# Patient Record
Sex: MALE | Race: WHITE | Employment: OTHER | URBAN - METROPOLITAN AREA
[De-identification: names, ages, dates, MRNs, and addresses within clinical notes are randomized per-mention and may not be internally consistent; named-entity substitution may affect disease eponyms.]

---

## 2017-01-25 ENCOUNTER — OFFICE VISIT (OUTPATIENT)
Dept: CARDIOLOGY CLINIC | Age: 62
End: 2017-01-25

## 2017-01-25 VITALS
DIASTOLIC BLOOD PRESSURE: 88 MMHG | BODY MASS INDEX: 24.13 KG/M2 | WEIGHT: 188 LBS | HEART RATE: 69 BPM | SYSTOLIC BLOOD PRESSURE: 122 MMHG | HEIGHT: 74 IN | OXYGEN SATURATION: 98 %

## 2017-01-25 DIAGNOSIS — I49.3 PVC (PREMATURE VENTRICULAR CONTRACTION): ICD-10-CM

## 2017-01-25 DIAGNOSIS — E78.2 MIXED HYPERLIPIDEMIA: ICD-10-CM

## 2017-01-25 DIAGNOSIS — I48.0 PAROXYSMAL ATRIAL FIBRILLATION (HCC): Primary | ICD-10-CM

## 2017-01-25 NOTE — PROGRESS NOTES
Visit Vitals    /88 (BP 1 Location: Left arm, BP Patient Position: Sitting)    Pulse 69    Ht 6' 1.8\" (1.875 m)    Wt 188 lb (85.3 kg)    SpO2 98%    BMI 24.27 kg/m2   ;

## 2017-01-25 NOTE — PATIENT INSTRUCTIONS

## 2017-01-25 NOTE — PROGRESS NOTES
Ranjit Dinero MD. [facility]              Patient: Tyrone Rain  : 1955      Today's Date: 2017            HISTORY OF PRESENT ILLNESS:     History of Present Illness:  Mr. Urmila Cherry is doing well overall. Some palpitations sometimes - lasts a few seconds - random - happens rarely - when he is trying to sleep. He has some tingling in legs sometimes. No leg pain walking. No CP or SOB. He is active at work. PAST MEDICAL HISTORY:     Past Medical History   Diagnosis Date    Atrial fibrillation [facility])      ER visit on 12 - converted to NSR with Corvert    Bradycardia      Started seeing Dr. Masoud Kingsley and Dr. Juany Appiahelor for this years ago    GERD (gastroesophageal reflux disease)      [place]    Hypoglycemia     Mixed hyperlipidemia     Nausea & vomiting     PVC (premature ventricular contraction)        Past Surgical History   Procedure Laterality Date    Pr total knee arthroplasty  2008     right    Hx knee arthroscopy  1972     right    Hx urological       epididydectomy left    Hx other surgical  ,      excision 7 lipomas, excision 3 lipomas,mole excision back    Echo stress       normal resting LV wall motion and no evidence of ischemia. Gated EF (%): 60-65%    Loop monitor       no significant arrhythmias infrequent atrial extrasystoles and ventricular extrasystoles.  Hx other surgical       Echo 12 - Normal    Hx gi  1998     Nissen fundiplication    Hx gi       polypectomy from colon    Hx hernia repair       Left IHR, right IHR, hiatal hernia    Hx hernia repair           MEDICATIONS:     Current Outpatient Prescriptions   Medication Sig Dispense Refill    simvastatin (ZOCOR) 40 mg tablet Take 1 Tab by mouth nightly. 30 Tab 6    allopurinol (ZYLOPRIM) 100 mg tablet Take  by mouth daily.  FERROUS FUMARATE (IRON PO) Take 30 mg by mouth daily.  INDOMETHACIN (INDOCIN PO) Take 50 mg by mouth as needed.       multivitamin, stress formula (STRESS TAB) tablet Take 1 Tab by mouth daily.  aspirin delayed-release (ASPIR-81) 81 mg tablet Take 81 mg by mouth daily.  omega-3 fatty acids-vitamin e (FISH OIL) 1,000 mg Cap Take 2 Caps by mouth daily. Allergies   Allergen Reactions    Vioxx [Rofecoxib] Other (comments)     Mouth ulcers    Zofran Odt [Ondansetron] Nausea Only             SOCIAL HISTORY:     Social History   Substance Use Topics    Smoking status: Former Smoker     Packs/day: 1.00     Years: 12.00     Quit date: 3/31/1986    Smokeless tobacco: None    Alcohol use 1.0 oz/week     2 Cans of beer per week           FAMILY HISTORY:     Family History   Problem Relation Age of Onset    Arrhythmia Father             REVIEW OF SYSTEMS:      Review of Systems:   Constitutional: Negative for fever, chills   HEENT: Negative for vision changes. Respiratory: Negative for cough   Cardiovascular: Negative for orthopnea, syncope, and PND. Gastrointestinal: Negative for abdominal pain, diarrhea, or melena   Genitourinary: Negative for dysuria   Musculoskeletal: Negative for myalgias. Skin: Negative for rash   Heme: No problems bleeding. Neurological: Negative for speech change and focal weakness.                PHYSICAL EXAM:     Physical Exam:  Visit Vitals    /88 (BP 1 Location: Left arm, BP Patient Position: Sitting)    Pulse 69    Ht 6' 1.8\" (1.875 m)    Wt 188 lb (85.3 kg)    SpO2 98%    BMI 24.27 kg/m2     Patient appears generally well, mood and affect are appropriate and pleasant. HEENT:  Hearing intact, non-icteric, normocephalic, atraumatic. Neck Exam: Supple, No JVD or carotid bruits. Lung Exam: Clear to auscultation, even breath sounds. Cardiac Exam: Regular rate and rhythm with no murmur  Abdomen: Soft, non-tender, normal bowel sounds. No bruits or masses. Extremities: Moves all ext well. No lower extremity edema. Vascular: 2+ dorsalis pedis pulses bilaterally.   Psych: Appropriate affect  Neuro - Grossly intact             LABS / OTHER STUDIES:         Labs - 7/16/12 - CBC and CMP normal; chol 142, TG 95, LDL 68, HDL 55   Labs 12/11/14 - chol 154, TG 68, HDL 72, LDL 68, Ca 11, K 4.1, Cr 1.07, LFT's OK           CARDIAC DIAGNOSTICS:      Cardiac Evaluation Includes:  Treadmill stress test 12/2/13 - walked 10:04 (11.8 METS). Sinus bradycardia at rest. Stress  bpm (94% MPHR). No ischemic EKG changes. Frequent PVC's in exercise and recovery (asymptomatic). Echo 12/2/13 - LVEF 60-65%. Mild LAE.         EKG - 7/18/12 - NSR, HR 61, normal   EKG - 9/30/13 - NSR, normal  EKG 11/17/14 - sinus bradycardia, HR 50  EKG 11/30/15 - sinus bradycardia, HR 49  EKG 1/25/17 - sinus bradycardia, HR 55           ASSESSMENT AND PLAN:      Assessment and Plan:   1) Singe documented episode of Afib 2/12   - he was in the ER on 11/10/13 with some palpitations -he may have had some PAF according to PA note, however only documentation of NSR on EKG   - His [id number] score is 0   - Echo 12/2/13 - LVEF 60-65%. Mild LAE. - no standing beta-blocker due to resting bradycardia      2) Chronic resting bradycardia   - asymptomatic   - Event monitor in 2007 unremarkable   - Treadmill stress test 12/2/13 - walked 10:04 (11.8 METS). Sinus bradycardia at rest. Stress  bpm (94% MPHR). No ischemic EKG changes. Frequent PVC's in exercise and recovery (asymptomatic). - He is active without significant complaints      3) Dyslipidemia   - on pravastatin lipids OK - followed by PCP - will get labs to review     4) Borderline BP elevation  - info on diet given      5) See me in one year. Patient expressed understanding of the plan - questions were answered. 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        Becca Dickson MD, [place] [address], [address]  [address]  [facility] Soren Pichardo, [address] [place] [address]  Ph: [phone]   Ph [phone]

## 2017-01-25 NOTE — MR AVS SNAPSHOT
Visit Information Date & Time Provider Department Dept. Phone Encounter #  
 1/25/2017  3:20 PM Marichuy Chen MD [facility] [phone] [id number] Your Appointments 1/31/2018  3:20 PM  
ESTABLISHED PATIENT with Marichuy Chen MD  
[facility] ([facility]) Appt Note: 1 yr fu appt sll  
 [address] [address] [phone]  
  
   
 [address] [address] Upcoming Health Maintenance Date Due Hepatitis C Screening 1955 DTaP/Tdap/Td series (1 - Tdap) 7/19/1977 FOBT Q 1 YEAR AGE 50-75 7/19/2006 ZOSTER VACCINE AGE 60> 7/19/2016 INFLUENZA AGE 9 TO ADULT 8/1/2016 Allergies as of 1/25/2017  Review Complete On: 1/25/2017 By: Marichuy Chen MD  
  
 Severity Noted Reaction Type Reactions Vioxx [Rofecoxib]  03/31/2011    Other (comments) Mouth ulcers Zofran Odt [Ondansetron]  01/25/2017    Nausea Only Current Immunizations  Never Reviewed No immunizations on file. Not reviewed this visit You Were Diagnosed With   
  
 Codes Comments Paroxysmal atrial fibrillation (HCC)    -  Primary ICD-10-CM: I48.0 ICD-9-CM: 427.31 PVC (premature ventricular contraction)     ICD-10-CM: I49.3 ICD-9-CM: 427.69 Mixed hyperlipidemia     ICD-10-CM: E78.2 ICD-9-CM: 272.2 Vitals BP Pulse Height(growth percentile) Weight(growth percentile) SpO2 BMI  
 122/88 (BP 1 Location: Left arm, BP Patient Position: Sitting) 69 6' 1.8\" (1.875 m) 188 lb (85.3 kg) 98% 24.27 kg/m2 Smoking Status Former Smoker Vitals History BMI and BSA Data Body Mass Index Body Surface Area  
 24.27 kg/m 2 2.11 m 2 Preferred Pharmacy Pharmacy Name Phone [address] [place] [address], [address] [place] [address] [phone] Your Updated Medication List  
  
   
 This list is accurate as of: 1/25/17  4:12 PM.  Always use your most recent med list.  
  
  
  
  
 allopurinol 100 mg tablet Commonly known as:  Ardie Fleischer Take  by mouth daily. ASPIR-81 81 mg tablet Generic drug:  aspirin delayed-release Take 81 mg by mouth daily. FISH OIL 1,000 mg Cap Generic drug:  omega-3 fatty acids-vitamin e Take 2 Caps by mouth daily. INDOCIN PO Take 50 mg by mouth as needed. IRON PO Take 30 mg by mouth daily. multivitamin, stress formula tablet Commonly known as:  STRESS TAB Take 1 Tab by mouth daily. simvastatin 40 mg tablet Commonly known as:  ZOCOR Take 1 Tab by mouth nightly. We Performed the Following AMB POC EKG ROUTINE W/ 12 LEADS, INTER & REP [56450 CPT(R)] Patient Instructions Heart-Healthy Diet: Care Instructions Your Care Instructions A heart-healthy diet has lots of vegetables, fruits, nuts, beans, and whole grains, and is low in salt. It limits foods that are high in saturated fat, such as meats, cheeses, and fried foods. It may be hard to change your diet, but even small changes can lower your risk of heart attack and heart disease. Follow-up care is a key part of your treatment and safety. Be sure to make and go to all appointments, and call your doctor if you are having problems. It's also a good idea to know your test results and keep a list of the medicines you take. How can you care for yourself at home? Watch your portions · Learn what a serving is. A \"serving\" and a \"portion\" are not always the same thing. Make sure that you are not eating larger portions than are recommended. For example, a serving of pasta is ½ cup. A serving size of meat is 2 to 3 ounces. A 3-ounce serving is about the size of a deck of cards. Measure serving sizes until you are good at [place]" them. Keep in mind that restaurants often serve portions that are 2 or 3 times the size of one serving. · To keep your energy level up and keep you from feeling hungry, eat often but in smaller portions. · Eat only the number of calories you need to stay at a healthy weight. If you need to lose weight, eat fewer calories than your body burns (through exercise and other physical activity). Eat more fruits and vegetables · Eat a variety of fruit and vegetables every day. Dark green, deep orange, red, or yellow fruits and vegetables are especially good for you. Examples include spinach, carrots, peaches, and berries. · Keep carrots, celery, and other veggies handy for snacks. Buy fruit that is in season and store it where you can see it so that you will be tempted to eat it. · Cook dishes that have a lot of veggies in them, such as stir-fries and soups. Limit saturated and trans fat · Read food labels, and try to avoid saturated and trans fats. They increase your risk of heart disease. Trans fat is found in many processed foods such as cookies and crackers. · Use olive or canola oil when you cook. Try cholesterol-lowering spreads, such as Benecol or Take Control. · Bake, broil, grill, or steam foods instead of frying them. · Choose lean meats instead of high-fat meats such as hot dogs and sausages. Cut off all visible fat when you prepare meat. · Eat fish, skinless poultry, and meat alternatives such as soy products instead of high-fat meats. Soy products, such as tofu, may be especially good for your heart. · Choose low-fat or fat-free milk and dairy products. Eat fish · Eat at least two servings of fish a week. Certain fish, such as salmon and tuna, contain omega-3 fatty acids, which may help reduce your risk of heart attack. Eat foods high in fiber · Eat a variety of grain products every day. Include whole-grain foods that have lots of fiber and nutrients. Examples of whole-grain foods include oats, whole wheat bread, and brown rice. · Buy whole-grain breads and cereals, instead of white bread or pastries. Limit salt and sodium · Limit how much salt and sodium you eat to help lower your blood pressure. · Taste food before you salt it. Add only a little salt when you think you need it. With time, your taste buds will adjust to less salt. · Eat fewer snack items, fast foods, and other high-salt, processed foods. Check food labels for the amount of sodium in packaged foods. · Choose low-sodium versions of canned goods (such as soups, vegetables, and beans). Limit sugar · Limit drinks and foods with added sugar. These include candy, desserts, and soda pop. Limit alcohol · Limit alcohol to no more than 2 drinks a day for men and 1 drink a day for women. Too much alcohol can cause health problems. When should you call for help? Watch closely for changes in your health, and be sure to contact your doctor if: 
· You would like help planning heart-healthy meals. Where can you learn more? Go to http://manish-pedro.info/. Enter V137 in the search box to learn more about \"Heart-Healthy Diet: Care Instructions. \" Current as of: January 27, 2016 Content Version: 11.1 © 5113-0805 Moment[employer], ADTELLIGENCE. Care instructions adapted under license by [employer] (which disclaims liability or warranty for this information). If you have questions about a medical condition or this instruction, always ask your healthcare professional. [place] [address] any warranty or liability for your use of this information. Introducing [facility] & HEALTH SERVICES! [employer] introduces [employer] patient portal. Now you can access parts of your medical record, email your doctor's office, and request medication refills online. 1. In your internet browser, go to [email or web address] 2. Click on the First Time User? Click Here link in the Sign In box. You will see the New Member Sign Up page. 3. Enter your Pantech Access Code exactly as it appears below. You will not need to use this code after youve completed the sign-up process. If you do not sign up before the expiration date, you must request a new code. · Pantech Access Code: [email or web address] Expires: 4/25/2017  3:16 PM 
 
4. Enter the last four digits of your Social Security Number (xxxx) and Date of Birth (mm/dd/yyyy) as indicated and click Submit. You will be taken to the next sign-up page. 5. Create a Pantech ID. This will be your Pantech login ID and cannot be changed, so think of one that is secure and easy to remember. 6. Create a Pantech password. You can change your password at any time. 7. Enter your Password Reset Question and Answer. This can be used at a later time if you forget your password. 8. Enter your e-mail address. You will receive e-mail notification when new information is available in [address]. 9. Click Sign Up. You can now view and download portions of your medical record. 10. Click the Download Summary menu link to download a portable copy of your medical information. If you have questions, please visit the Frequently Asked Questions section of the Pantech website. Remember, Pantech is NOT to be used for urgent needs. For medical emergencies, dial 911. Now available from your iPhone and Android! Please provide this summary of care documentation to your next provider. Your primary care clinician is listed as Yessenia Jones. If you have any questions after today's visit, please call [phone].

## 2017-06-30 RX ORDER — SIMVASTATIN 40 MG/1
40 TABLET, FILM COATED ORAL
Qty: 30 TAB | Refills: 6 | Status: SHIPPED | OUTPATIENT
Start: 2017-06-30 | End: 2018-01-15 | Stop reason: SDUPTHER

## 2018-01-31 ENCOUNTER — OFFICE VISIT (OUTPATIENT)
Dept: CARDIOLOGY CLINIC | Age: 63
End: 2018-01-31

## 2018-01-31 VITALS
WEIGHT: 188 LBS | SYSTOLIC BLOOD PRESSURE: 122 MMHG | HEART RATE: 67 BPM | BODY MASS INDEX: 24.27 KG/M2 | OXYGEN SATURATION: 97 % | DIASTOLIC BLOOD PRESSURE: 80 MMHG

## 2018-01-31 DIAGNOSIS — I49.3 PVC (PREMATURE VENTRICULAR CONTRACTION): ICD-10-CM

## 2018-01-31 DIAGNOSIS — I48.0 PAROXYSMAL ATRIAL FIBRILLATION (HCC): Primary | ICD-10-CM

## 2018-01-31 DIAGNOSIS — E78.2 MIXED HYPERLIPIDEMIA: ICD-10-CM

## 2018-01-31 NOTE — MR AVS SNAPSHOT
[address] [address] 
[phone] Patient: Pardeep Abernathy MRN: [id number] XFX:[id number] Visit Information Date & Time Provider Department Dept. Phone Encounter #  
 1/31/2018  3:20 PM Yesenia Grover MD [facility] [phone] [id number] Upcoming Health Maintenance Date Due Hepatitis C Screening 1955 DTaP/Tdap/Td series (1 - Tdap) 7/19/1977 FOBT Q 1 YEAR AGE 50-75 7/19/2006 ZOSTER VACCINE AGE 60> 5/19/2016 Influenza Age 5 to Adult 8/1/2017 Allergies as of 1/31/2018  Review Complete On: 1/31/2018 By: Yesenia Grover MD  
  
 Severity Noted Reaction Type Reactions Vioxx [Rofecoxib]  03/31/2011    Other (comments) Mouth ulcers Zofran Odt [Ondansetron]  01/25/2017    Nausea Only Current Immunizations  Never Reviewed No immunizations on file. Not reviewed this visit You Were Diagnosed With   
  
 Codes Comments Paroxysmal atrial fibrillation (HCC)    -  Primary ICD-10-CM: I48.0 ICD-9-CM: 427.31 PVC (premature ventricular contraction)     ICD-10-CM: I49.3 ICD-9-CM: 427.69 Mixed hyperlipidemia     ICD-10-CM: E78.2 ICD-9-CM: 272.2 Vitals BP Pulse Weight(growth percentile) SpO2 BMI Smoking Status 122/80 (BP 1 Location: Left arm, BP Patient Position: Sitting) 67 188 lb (85.3 kg) 97% 24.27 kg/m2 Former Smoker Vitals History BMI and BSA Data Body Mass Index Body Surface Area  
 24.27 kg/m 2 2.11 m 2 Preferred Pharmacy Pharmacy Name Phone [id number] [address] [facility] [place] [phone] Your Updated Medication List  
  
   
This list is accurate as of: 1/31/18  3:48 PM.  Always use your most recent med list.  
  
  
  
  
 allopurinol 100 mg tablet Commonly known as:  Killian Graciela Take  by mouth daily. ASPIR-81 81 mg tablet Generic drug:  aspirin delayed-release Take 81 mg by mouth daily. CIPROFLOXACIN PO Take  by mouth. Indications: last day will be 2/2/17 FISH OIL 1,000 mg Cap Generic drug:  omega-3 fatty acids-vitamin e Take 2 Caps by mouth daily. INDOCIN PO Take 50 mg by mouth as needed. IRON PO Take 65 mg by mouth daily. Indications: 0.5 tab  
  
 multivitamin, stress formula tablet Commonly known as:  STRESS TAB Take 1 Tab by mouth daily. simvastatin 40 mg tablet Commonly known as:  ZOCOR Take 1 Tab by mouth nightly. We Performed the Following AMB POC EKG ROUTINE W/ 12 LEADS, INTER & REP [00014 CPT(R)] Introducing [facility] & HEALTH SERVICES! [facility] introduces Metaset patient portal. Now you can access parts of your medical record, email your doctor's office, and request medication refills online. 1. In your internet browser, go to [email or web address] 2. Click on the First Time User? Click Here link in the Sign In box. You will see the New Member Sign Up page. 3. Enter your Metaset Access Code exactly as it appears below. You will not need to use this code after youve completed the sign-up process. If you do not sign up before the expiration date, you must request a new code. · Metaset Access Code: [email or web address] Expires: 5/1/2018  3:48 PM 
 
4. Enter the last four digits of your Social Security Number (xxxx) and Date of Birth (mm/dd/yyyy) as indicated and click Submit. You will be taken to the next sign-up page. 5. Create a Serebra Learningt ID. This will be your Metaset login ID and cannot be changed, so think of one that is secure and easy to remember. 6. Create a Metaset password. You can change your password at any time. 7. Enter your Password Reset Question and Answer. This can be used at a later time if you forget your password. 8. Enter your e-mail address.  You will receive e-mail notification when new information is available in Changers. 9. Click Sign Up. You can now view and download portions of your medical record. 10. Click the Download Summary menu link to download a portable copy of your medical information. If you have questions, please visit the Frequently Asked Questions section of the Changers website. Remember, Changers is NOT to be used for urgent needs. For medical emergencies, dial 911. Now available from your iPhone and Android! Please provide this summary of care documentation to your next provider. Your primary care clinician is listed as Demetrius Barnard. If you have any questions after today's visit, please call [phone].

## 2018-01-31 NOTE — PROGRESS NOTES
Mejia Matias MD. [facility] [place]              Patient: Darin Ledbetter  : 1955      Today's Date: 2018            HISTORY OF PRESENT ILLNESS:     History of Present Illness:  Mr. Yessy Jensen is here for follow-up. No complaints. No complaints of chest pain, orthopnea, PND, edema, claudication, palpitations, lightheadedness, or syncope. PAST MEDICAL HISTORY:     Past Medical History:   Diagnosis Date    Atrial fibrillation [facility])     ER visit on 12 - converted to NSR with Corvert    Bradycardia     Started seeing Dr. Gaby Milton and Dr. Lorena Slater for this years ago    GERD (gastroesophageal reflux disease)     [place]ano    Hypoglycemia     Mixed hyperlipidemia     Nausea & vomiting     PVC (premature ventricular contraction)          Past Surgical History:   Procedure Laterality Date    ECHO STRESS      normal resting LV wall motion and no evidence of ischemia. Gated EF (%): 60-65%    HX GI  1998    Nissen fundiplication    HX GI      polypectomy from colon    HX HERNIA REPAIR      Left IHR, right IHR, hiatal hernia    HX HERNIA REPAIR      HX KNEE ARTHROSCOPY  1972    right    HX OTHER SURGICAL  ,     excision 7 lipomas, excision 3 lipomas,mole excision back    HX OTHER SURGICAL      Echo 12 - Normal    HX UROLOGICAL      epididydectomy left    LOOP MONITOR      no significant arrhythmias infrequent atrial extrasystoles and ventricular extrasystoles.  TOTAL KNEE ARTHROPLASTY  2008    right           MEDICATIONS:     Current Outpatient Prescriptions   Medication Sig Dispense Refill    CIPROFLOXACIN PO Take  by mouth. Indications: last day will be 17      simvastatin (ZOCOR) 40 mg tablet Take 1 Tab by mouth nightly. 30 Tab 0    allopurinol (ZYLOPRIM) 100 mg tablet Take  by mouth daily.  FERROUS FUMARATE (IRON PO) Take 65 mg by mouth daily. Indications: 0.5 tab      INDOMETHACIN (INDOCIN PO) Take 50 mg by mouth as needed.       multivitamin, stress formula (STRESS TAB) tablet Take 1 Tab by mouth daily.  aspirin delayed-release (ASPIR-81) 81 mg tablet Take 81 mg by mouth daily.  omega-3 fatty acids-vitamin e (FISH OIL) 1,000 mg Cap Take 2 Caps by mouth daily. Allergies   Allergen Reactions    Vioxx [Rofecoxib] Other (comments)     Mouth ulcers    Zofran Odt [Ondansetron] Nausea Only             SOCIAL HISTORY:     Social History   Substance Use Topics    Smoking status: Former Smoker     Packs/day: 1.00     Years: 12.00     Quit date: 3/31/1986    Smokeless tobacco: Never Used    Alcohol use 1.0 oz/week     2 Cans of beer per week         FAMILY HISTORY:     Family History   Problem Relation Age of Onset    Arrhythmia Father             REVIEW OF SYSTEMS:       Review of Systems:   Constitutional: Negative for fever, chills   HEENT: Negative for vision changes. Respiratory: Negative for cough   Cardiovascular: Negative for orthopnea, syncope, and PND. Gastrointestinal: Negative for abdominal pain, diarrhea, or melena   Genitourinary: Negative for dysuria   Musculoskeletal: Negative for myalgias. Skin: Negative for rash   Heme: No problems bleeding. Neurological: Negative for speech change and focal weakness.                     PHYSICAL EXAM:      Physical Exam:  Visit Vitals    /80 (BP 1 Location: Left arm, BP Patient Position: Sitting)    Pulse 67    Wt 188 lb (85.3 kg)    SpO2 97%    BMI 24.27 kg/m2       Patient appears generally well, mood and affect are appropriate and pleasant. HEENT:  Hearing intact, non-icteric, normocephalic, atraumatic. Neck Exam: Supple, No JVD or carotid bruits. Lung Exam: Clear to auscultation, even breath sounds. Cardiac Exam: Regular rate and rhythm with no murmur  Abdomen: Soft, non-tender, normal bowel sounds. No bruits or masses. Extremities: Moves all ext well. No lower extremity edema. Vascular: 2+ dorsalis pedis pulses bilaterally.   Psych: Appropriate affect  Neuro - Grossly intact                 LABS / OTHER STUDIES:           Labs - 7/16/12 - CBC and CMP normal; chol 142, TG 95, LDL 68, HDL 55   Labs 12/11/14 - chol 154, TG 68, HDL 72, LDL 68, Ca 11, K 4.1, Cr 1.07, LFT's OK    Labs to be drawn by [facility]. Ramon Briscoe      CARDIAC DIAGNOSTICS:       Cardiac Evaluation Includes:  Treadmill stress test 12/2/13 - walked 10:04 (11.8 METS). Sinus bradycardia at rest. Stress  bpm (94% MPHR). No ischemic EKG changes. Frequent PVC's in exercise and recovery (asymptomatic). Echo 12/2/13 - LVEF 60-65%. Mild LAE.         EKG - 7/18/12 - NSR, HR 61, normal   EKG - 9/30/13 - NSR, normal  EKG 11/17/14 - sinus bradycardia, HR 50  EKG 11/30/15 - sinus bradycardia, HR 49  EKG 1/25/17 - sinus bradycardia, HR 55   EKG 1/31/18 - sinus bradycardia, HR 54              ASSESSMENT AND PLAN:       Assessment and Plan:   1) Singe documented episode of Afib 2/12   - he was in the ER on 11/10/13 with some palpitations -he may have had some PAF according to PA note, however only documentation of NSR on EKG   - His [id number] score is 0   - Echo 12/2/13 - LVEF 60-65%. Mild LAE. - no standing beta-blocker due to resting bradycardia       2) Chronic resting bradycardia   - asymptomatic   - Event monitor in 2007 unremarkable   - Treadmill stress test 12/2/13 - walked 10:04 (11.8 METS). Sinus bradycardia at rest. Stress  bpm (94% MPHR). No ischemic EKG changes. Frequent PVC's in exercise and recovery (asymptomatic). - He is active without significant complaints       3) Dyslipidemia   - on simvastatin - lipids OK - followed by PCP       4) See me in one year. Patient expressed understanding of the plan - questions were answered. 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   [facility] Sept 2017.           Katy Bonilla MD, [facility], [address] [address] [address]  [address], [address] [place] [address]  Ph: [phone]   Ph [phone]

## 2018-01-31 NOTE — PROGRESS NOTES
Pt has no complaints/no cardiac concerns    Visit Vitals    /80 (BP 1 Location: Left arm, BP Patient Position: Sitting)    Pulse 67    Wt 188 lb (85.3 kg)    SpO2 97%    BMI 24.27 kg/m2

## 2018-03-08 RX ORDER — SIMVASTATIN 40 MG/1
40 TABLET, FILM COATED ORAL
Qty: 30 TAB | Refills: 11 | Status: SHIPPED | OUTPATIENT
Start: 2018-03-08 | End: 2019-03-12 | Stop reason: SDUPTHER

## 2019-02-13 ENCOUNTER — OFFICE VISIT (OUTPATIENT)
Dept: CARDIOLOGY CLINIC | Age: 64
End: 2019-02-13

## 2019-02-13 VITALS
WEIGHT: 188 LBS | SYSTOLIC BLOOD PRESSURE: 126 MMHG | HEART RATE: 52 BPM | BODY MASS INDEX: 24.13 KG/M2 | RESPIRATION RATE: 16 BRPM | DIASTOLIC BLOOD PRESSURE: 80 MMHG | HEIGHT: 74 IN

## 2019-02-13 DIAGNOSIS — R00.1 BRADYCARDIA: Primary | ICD-10-CM

## 2019-02-13 DIAGNOSIS — I49.3 PVC (PREMATURE VENTRICULAR CONTRACTION): ICD-10-CM

## 2019-02-13 RX ORDER — SAW PALMETTO 160 MG
CAPSULE ORAL
COMMUNITY
End: 2020-09-29

## 2019-02-13 NOTE — PROGRESS NOTES
Vishnu Bradshaw MD. [facility] [place]              Patient: Sylvester Menchaca  : 1955      Today's Date: 2019            HISTORY OF PRESENT ILLNESS:     History of Present Illness:  Here for follow-up. Doing well overall. He enjoys cruising. No complaints. No CP or SOB. Infrequent palpitations. PAST MEDICAL HISTORY:     Past Medical History:   Diagnosis Date    Atrial fibrillation [facility])     ER visit on 12 - converted to NSR with Corvert    Bradycardia     Started seeing Dr. Richard Cruz and Dr. Ronak Parry for this years ago    GERD (gastroesophageal reflux disease)     [place]ano    Gout     Hypoglycemia     Mixed hyperlipidemia     Nausea & vomiting     PVC (premature ventricular contraction)        Past Surgical History:   Procedure Laterality Date    ECHO STRESS      normal resting LV wall motion and no evidence of ischemia. Gated EF (%): 60-65%    HX GI      Nissen fundiplication    HX GI      polypectomy from colon    HX HERNIA REPAIR      Left IHR, right IHR, hiatal hernia    HX HERNIA REPAIR      HX KNEE ARTHROSCOPY  1972    right    HX OTHER SURGICAL  ,     excision 7 lipomas, excision 3 lipomas,mole excision back    HX OTHER SURGICAL      Echo 12 - Normal    HX UROLOGICAL      epididydectomy left    LOOP MONITOR      no significant arrhythmias infrequent atrial extrasystoles and ventricular extrasystoles.  TOTAL KNEE ARTHROPLASTY  2008    right         MEDICATIONS:     Current Outpatient Medications   Medication Sig Dispense Refill    saw palmetto 160 mg cap Take  by mouth.  simvastatin (ZOCOR) 40 mg tablet Take 1 Tab by mouth nightly. 30 Tab 11    FERROUS FUMARATE (IRON PO) Take 65 mg by mouth daily. Indications: 0.5 tab      INDOMETHACIN (INDOCIN PO) Take 50 mg by mouth as needed.  multivitamin, stress formula (STRESS TAB) tablet Take 1 Tab by mouth daily.       aspirin delayed-release (ASPIR-81) 81 mg tablet Take 81 mg by mouth daily.  omega-3 fatty acids-vitamin e (FISH OIL) 1,000 mg Cap Take 2 Caps by mouth daily.  CIPROFLOXACIN PO Take  by mouth. Indications: last day will be 17      allopurinol (ZYLOPRIM) 100 mg tablet Take  by mouth daily. Allergies   Allergen Reactions    Vioxx [Rofecoxib] Other (comments)     Mouth ulcers    Zofran Odt [Ondansetron] Nausea Only           SOCIAL HISTORY:     Social History     Tobacco Use    Smoking status: Former Smoker     Packs/day: 1.00     Years: 12.00     Pack years: 12.00     Last attempt to quit: 3/31/1986     Years since quittin.8    Smokeless tobacco: Never Used   Substance Use Topics    Alcohol use: Yes     Alcohol/week: 1.0 oz     Types: 2 Cans of beer per week    Drug use: No         FAMILY HISTORY:     Family History   Problem Relation Age of Onset    Arrhythmia Father                  REVIEW OF SYSTEMS:       Review of Systems:   Constitutional: Negative for fever, chills   HEENT: Negative for vision changes. Respiratory: Negative for cough   Cardiovascular: Negative for orthopnea, syncope, and PND. Gastrointestinal: Negative for abdominal pain, diarrhea, or melena   Genitourinary: Negative for dysuria   Musculoskeletal: Negative for myalgias. Skin: Negative for rash   Heme: No problems bleeding. Neurological: Negative for speech change and focal weakness.                     PHYSICAL EXAM:      Physical Exam:  Visit Vitals  /80 (BP 1 Location: Left arm, BP Patient Position: Sitting)   Pulse (!) 52   Resp 16   Ht 6' 1.8\" (1.875 m)   Wt 188 lb (85.3 kg)   BMI 24.27 kg/m²          Patient appears generally well, mood and affect are appropriate and pleasant. HEENT:  Hearing intact, non-icteric, normocephalic, atraumatic. Neck Exam: Supple, No JVD or carotid bruits. Lung Exam: Clear to auscultation, even breath sounds. Cardiac Exam: Regular rate and rhythm with no murmur  Abdomen: Soft, non-tender, normal bowel sounds.  No bruits or masses. Extremities: Moves all ext well. No lower extremity edema. Vascular: 2+ dorsalis pedis pulses bilaterally. Psych: Appropriate affect  Neuro - Grossly intact                 LABS / OTHER STUDIES:           Labs - 7/16/12 - CBC and CMP normal; chol 142, TG 95, LDL 68, HDL 55   Labs 12/11/14 - chol 154, TG 68, HDL 72, LDL 68, Ca 11, K 4.1, Cr 1.07, LFT's OK     Labs followed by PCP             CARDIAC DIAGNOSTICS:       Cardiac Evaluation Includes:  Treadmill stress test 12/2/13 - walked 10:04 (11.8 METS). Sinus bradycardia at rest. Stress  bpm (94% MPHR). No ischemic EKG changes. Frequent PVC's in exercise and recovery (asymptomatic). Echo 12/2/13 - LVEF 60-65%. Mild LAE.         EKG - 7/18/12 - NSR, HR 61, normal   EKG - 9/30/13 - NSR, normal  EKG 11/17/14 - sinus bradycardia, HR 50  EKG 11/30/15 - sinus bradycardia, HR 49  EKG 1/25/17 - sinus bradycardia, HR 55   EKG 1/31/18 - sinus bradycardia, HR 54     EKG 2/13/19 - sinus bradycardia, HR 47            ASSESSMENT AND PLAN:       Assessment and Plan:   1) Singe documented episode of Afib 2/12   - he was in the ER on 11/10/13 with some palpitations -he may have had some PAF according to PA note, however only documentation of NSR on EKG   - His [id number] score is 0   - Echo 12/2/13 - LVEF 60-65%. Mild LAE. - no standing beta-blocker due to resting bradycardia       2) Chronic resting bradycardia   - asymptomatic   - Event monitor in 2007 unremarkable   - Treadmill stress test 12/2/13 - walked 10:04 (11.8 METS). Sinus bradycardia at rest. Stress  bpm (94% MPHR). No ischemic EKG changes. Frequent PVC's in exercise and recovery (asymptomatic). - He is active without significant complaints       3) Dyslipidemia   - on simvastatin - lipids followed by PCP       4) See me in one year. Patient expressed understanding of the plan - questions were answered. He is a pipe filter and . Georgina Butts has a rescue dog.    [employer] 12/18 from [place]. Enjoys cruising.          Stormy Mcdonald MD, [address]     [address]  [address] [place] [address], [address] [place] [address]  Ph: [phone]                               Ph [phone]

## 2019-02-13 NOTE — PROGRESS NOTES
Chief Complaint   Patient presents with    Cholesterol Problem     Hyperlipidemia    Irregular Heart Beat     AFIB, PVC's      Visit Vitals  /80 (BP 1 Location: Left arm, BP Patient Position: Sitting)   Pulse (!) 52   Resp 16   Ht 6' 1.8\" (1.875 m)   Wt 188 lb (85.3 kg)   BMI 24.27 kg/m²

## 2019-03-17 RX ORDER — SIMVASTATIN 40 MG/1
TABLET, FILM COATED ORAL
Qty: 90 TAB | Refills: 3 | Status: SHIPPED | OUTPATIENT
Start: 2019-03-17 | End: 2020-03-12 | Stop reason: SDUPTHER

## 2020-02-19 ENCOUNTER — OFFICE VISIT (OUTPATIENT)
Dept: CARDIOLOGY CLINIC | Age: 65
End: 2020-02-19

## 2020-02-19 VITALS
OXYGEN SATURATION: 99 % | HEIGHT: 74 IN | BODY MASS INDEX: 24.13 KG/M2 | WEIGHT: 188 LBS | DIASTOLIC BLOOD PRESSURE: 82 MMHG | HEART RATE: 63 BPM | SYSTOLIC BLOOD PRESSURE: 120 MMHG

## 2020-02-19 DIAGNOSIS — E78.2 MIXED HYPERLIPIDEMIA: ICD-10-CM

## 2020-02-19 DIAGNOSIS — R00.1 BRADYCARDIA: Primary | ICD-10-CM

## 2020-02-19 NOTE — PROGRESS NOTES
Avery Mathis is a 62 y.o. male    Chief Complaint   Patient presents with    Follow-up    Cholesterol Problem    Irregular Heart Beat     janny       Chest pain no  SOB no  Dizziness no  Swelling no  Recent hospital visit no  Refills no  Visit Vitals  /82 (BP 1 Location: Left arm, BP Patient Position: Sitting)   Pulse 63   Ht 6' 1.8\" (1.875 m)   Wt 188 lb (85.3 kg)   SpO2 99%   BMI 24.27 kg/m²

## 2020-02-19 NOTE — PROGRESS NOTES
Shonda Avila MD. [facility] [place]              Patient: Edwin Hernandez  : 1955      Today's Date: 2020          HISTORY OF PRESENT ILLNESS:     History of Present Illness:  He is here for follow-up. He is doing well. No cardiac complaints. No CP or SOB. PAST MEDICAL HISTORY:     Past Medical History:   Diagnosis Date    Atrial fibrillation [facility])     ER visit on 12 - converted to NSR with Corvert    Bradycardia     Started seeing Dr. Susy Harris and Dr. Eliecer Garibay for this years ago    GERD (gastroesophageal reflux disease)     Chano    Gout     Hypoglycemia     Mixed hyperlipidemia     Nausea & vomiting     PVC (premature ventricular contraction)        Past Surgical History:   Procedure Laterality Date    ECHO STRESS      normal resting LV wall motion and no evidence of ischemia. Gated EF (%): 60-65%    HX GI      Nissen fundiplication    HX GI      polypectomy from colon    HX HERNIA REPAIR      Left IHR, right IHR, hiatal hernia    HX HERNIA REPAIR      HX KNEE ARTHROSCOPY  1972    right    HX OTHER SURGICAL  ,     excision 7 lipomas, excision 3 lipomas,mole excision back    HX OTHER SURGICAL      Echo 12 - Normal    HX UROLOGICAL      epididydectomy left    LOOP MONITOR      no significant arrhythmias infrequent atrial extrasystoles and ventricular extrasystoles.  TOTAL KNEE ARTHROPLASTY  2008    right           MEDICATIONS:     Current Outpatient Medications   Medication Sig Dispense Refill    simvastatin (ZOCOR) 40 mg tablet TAKE 1 TABLET BY MOUTH NIGHTLY 90 Tab 3    FERROUS FUMARATE (IRON PO) Take 65 mg by mouth daily. Indications: 0.5 tab      INDOMETHACIN (INDOCIN PO) Take 50 mg by mouth as needed.  multivitamin, stress formula (STRESS TAB) tablet Take 1 Tab by mouth daily.  aspirin delayed-release (ASPIR-81) 81 mg tablet Take 81 mg by mouth daily.       omega-3 fatty acids-vitamin e (FISH OIL) 1,000 mg Cap Take 2 Caps by mouth daily.  saw palmetto 160 mg cap Take  by mouth. Allergies   Allergen Reactions    Vioxx [Rofecoxib] Other (comments)     Mouth ulcers    Zofran Odt [Ondansetron] Nausea Only           SOCIAL HISTORY:     Social History     Tobacco Use    Smoking status: Former Smoker     Packs/day: 1.00     Years: 12.00     Pack years: 12.00     Last attempt to quit: 3/31/1986     Years since quittin.9    Smokeless tobacco: Never Used   Substance Use Topics    Alcohol use: Yes     Alcohol/week: 1.7 standard drinks     Types: 2 Cans of beer per week    Drug use: No         FAMILY HISTORY:     Family History   Problem Relation Age of Onset    Arrhythmia Father           REVIEW OF SYSTEMS:       Review of Systems:   Constitutional: Negative for fever, chills   HEENT: Negative for vision changes. Respiratory: Negative for cough   Cardiovascular: Negative for orthopnea, syncope, and PND. Gastrointestinal: Negative for abdominal pain, diarrhea, or melena   Genitourinary: Negative for dysuria   Musculoskeletal: Negative for myalgias. Skin: Negative for rash   Heme: No problems bleeding. Neurological: Negative for speech change and focal weakness.                     PHYSICAL EXAM:      Physical Exam:  Visit Vitals  /82 (BP 1 Location: Left arm, BP Patient Position: Sitting)   Pulse 63   Ht 6' 1.8\" (1.875 m)   Wt 188 lb (85.3 kg)   SpO2 99%   BMI 24.27 kg/m²          Patient appears generally well, mood and affect are appropriate and pleasant. HEENT:  Hearing intact, non-icteric, normocephalic, atraumatic. Neck Exam: Supple, No JVD or carotid bruits. Lung Exam: Clear to auscultation, even breath sounds. Cardiac Exam: Regular rate and rhythm with no murmur  Abdomen: Soft, non-tender, normal bowel sounds. Extremities: Moves all ext well. No lower extremity edema. Vascular: 2+ dorsalis pedis pulses bilaterally.   Psych: Appropriate affect  Neuro - Grossly intact                 LABS / OTHER STUDIES:           Labs - 7/16/12 - CBC and CMP normal; chol 142, TG 95, LDL 68, HDL 55   Labs 12/11/14 - chol 154, TG 68, HDL 72, LDL 68, Ca 11, K 4.1, Cr 1.07, LFT's OK     Labs followed by PCP             CARDIAC DIAGNOSTICS:       Cardiac Evaluation Includes:  Treadmill stress test 12/2/13 - walked 10:04 (11.8 METS). Sinus bradycardia at rest. Stress  bpm (94% MPHR). No ischemic EKG changes. Frequent PVC's in exercise and recovery (asymptomatic). Echo 12/2/13 - LVEF 60-65%. Mild LAE.         EKG - 7/18/12 - NSR, HR 61, normal   EKG - 9/30/13 - NSR, normal  EKG 11/17/14 - sinus bradycardia, HR 50  EKG 11/30/15 - sinus bradycardia, HR 49  EKG 1/25/17 - sinus bradycardia, HR 55   EKG 1/31/18 - sinus bradycardia, HR 54     EKG 2/13/19 - sinus bradycardia, HR 47  EKG 2/19/20 - sinus janny, HR 47          ASSESSMENT AND PLAN:       Assessment and Plan:   1) Singe documented episode of Afib 2/12   - he was in the ER on 11/10/13 with some palpitations -he may have had some PAF according to PA note, however only documentation of NSR on EKG   - His [id number] score is 0   - Echo 12/2/13 - LVEF 60-65%. Mild LAE. - no standing beta-blocker due to resting bradycardia   - he is doing great lately without complaints       2) Chronic resting bradycardia   - asymptomatic   - Event monitor in 2007 unremarkable   - Treadmill stress test 12/2/13 - walked 10:04 (11.8 METS). Sinus bradycardia at rest. Stress  bpm (94% MPHR). No ischemic EKG changes. Frequent PVC's in exercise and recovery (asymptomatic). - He is active without complaints - will repeat testing as needed       3) Dyslipidemia   - Cont simvastatin - lipids followed by PCP       4) See me in one year. Patient expressed understanding of the plan - questions were answered. He is a pipe filter and . Andre Hence has a rescue dog.   Plans for Cruise 5/20 from [place].  Enjoys cruising.   Manuel Coleman MD, [address]  [address], [address] [facility] [address]      [id number]  [address]  [facility], [address] [place] [address]  Ph: [phone]                                [phone]

## 2020-03-13 RX ORDER — SIMVASTATIN 40 MG/1
40 TABLET, FILM COATED ORAL
Qty: 90 TAB | Refills: 3 | Status: SHIPPED | OUTPATIENT
Start: 2020-03-13 | End: 2021-03-19 | Stop reason: SDUPTHER

## 2020-03-13 NOTE — TELEPHONE ENCOUNTER
Per Dr. Kenroy Dockery VO:  LOV:2/19/20  Future Appointments   Date Time Provider Mary Dodd   2/24/2021  9:00 AM Jaime Vega MD [address]     Requested Prescriptions     Pending Prescriptions Disp Refills    simvastatin (ZOCOR) 40 mg tablet 90 Tab 3

## 2020-09-28 ENCOUNTER — APPOINTMENT (OUTPATIENT)
Dept: GENERAL RADIOLOGY | Age: 65
End: 2020-09-28
Attending: EMERGENCY MEDICINE
Payer: COMMERCIAL

## 2020-09-28 ENCOUNTER — HOSPITAL ENCOUNTER (EMERGENCY)
Age: 65
Discharge: HOME | End: 2020-09-28
Attending: EMERGENCY MEDICINE
Payer: COMMERCIAL

## 2020-09-28 VITALS
SYSTOLIC BLOOD PRESSURE: 109 MMHG | DIASTOLIC BLOOD PRESSURE: 72 MMHG | BODY MASS INDEX: 23.24 KG/M2 | TEMPERATURE: 97.6 F | RESPIRATION RATE: 16 BRPM | OXYGEN SATURATION: 96 % | WEIGHT: 180 LBS | HEART RATE: 52 BPM

## 2020-09-28 DIAGNOSIS — I48.91 ATRIAL FIBRILLATION WITH RAPID VENTRICULAR RESPONSE (HCC): Primary | ICD-10-CM

## 2020-09-28 PROCEDURE — 74011000250 HC RX REV CODE- 250: Performed by: EMERGENCY MEDICINE

## 2020-09-28 PROCEDURE — 92960 CARDIOVERSION ELECTRIC EXT: CPT

## 2020-09-28 PROCEDURE — 93005 ELECTROCARDIOGRAM TRACING: CPT

## 2020-09-28 PROCEDURE — 71045 X-RAY EXAM CHEST 1 VIEW: CPT

## 2020-09-28 PROCEDURE — 74011000250 HC RX REV CODE- 250

## 2020-09-28 PROCEDURE — 99285 EMERGENCY DEPT VISIT HI MDM: CPT

## 2020-09-28 RX ORDER — ETOMIDATE 2 MG/ML
INJECTION INTRAVENOUS
Status: DISCONTINUED
Start: 2020-09-28 | End: 2020-09-28 | Stop reason: HOSPADM

## 2020-09-28 RX ORDER — ETOMIDATE 2 MG/ML
16 INJECTION INTRAVENOUS ONCE
Status: COMPLETED | OUTPATIENT
Start: 2020-09-28 | End: 2020-09-28

## 2020-09-28 RX ORDER — ETOMIDATE 2 MG/ML
0.1 INJECTION INTRAVENOUS
Status: DISCONTINUED | OUTPATIENT
Start: 2020-09-28 | End: 2020-09-28

## 2020-09-28 RX ADMIN — ETOMIDATE 16 MG: 2 INJECTION, SOLUTION INTRAVENOUS at 04:34

## 2020-09-28 NOTE — ED NOTES
Bedside and Verbal shift change report given to Jeramy Crowley RN (oncoming nurse) by Ward Valles RN (offgoing nurse). Report included the following information SBAR, ED Summary, Procedure Summary, MAR and Cardiac Rhythm Sinus Kim.

## 2020-09-28 NOTE — ED PROVIDER NOTES
Patient seen during connect care downtime please see downtime record for complete documentation. Briefly this is 41-year-old male with a history of GERD, hyperlipidemia, bradycardia, paroxysmal A. Fib presents with palpitations started at 11:30 PM.  Denies any leg swelling or prior history of PE or DVT. Denies any fevers or chills. Has associated chest discomfort. Mild shortness of breath. Is not on any blood thinners. Rapid Heart Rate          Past Medical History:   Diagnosis Date    Atrial fibrillation [facility])     ER visit on 2/16/12 - converted to NSR with Corvert    Bradycardia     Started seeing Dr. Marie Pierson and Dr. Jennifer Hardwick for this years ago    GERD (gastroesophageal reflux disease) 1998    [place]    Gout     Hypoglycemia     Mixed hyperlipidemia     Nausea & vomiting     PVC (premature ventricular contraction)        Past Surgical History:   Procedure Laterality Date    ECHO STRESS  2007    normal resting LV wall motion and no evidence of ischemia. Gated EF (%): 60-65%    HX GI  1998    Nissen fundiplication    HX GI      polypectomy from colon    HX HERNIA REPAIR      Left IHR, right IHR, hiatal hernia    HX HERNIA REPAIR      HX KNEE ARTHROSCOPY  1972    right    HX OTHER SURGICAL  1989, 2005    excision 7 lipomas, excision 3 lipomas,mole excision back    HX OTHER SURGICAL      Echo 2/17/12 - Normal    HX UROLOGICAL      epididydectomy left    LOOP MONITOR  2007    no significant arrhythmias infrequent atrial extrasystoles and ventricular extrasystoles.      TOTAL KNEE ARTHROPLASTY  2008    right         Family History:   Problem Relation Age of Onset    Arrhythmia Father        Social History     Socioeconomic History    Marital status:      Spouse name: Not on file    Number of children: Not on file    Years of education: Not on file    Highest education level: Not on file   Occupational History    Not on file   Social Needs    Financial resource strain: Not on file  Food insecurity     Worry: Not on file     Inability: Not on file    Transportation needs     Medical: Not on file     Non-medical: Not on file   Tobacco Use    Smoking status: Former Smoker     Packs/day: 1.00     Years: 12.00     Pack years: 12.00     Last attempt to quit: 3/31/1986     Years since quittin.5    Smokeless tobacco: Never Used   Substance and Sexual Activity    Alcohol use: Yes     Alcohol/week: 1.7 standard drinks     Types: 2 Cans of beer per week    Drug use: No    Sexual activity: Yes     Partners: Female   Lifestyle    Physical activity     Days per week: Not on file     Minutes per session: Not on file    Stress: Not on file   Relationships    Social connections     Talks on phone: Not on file     Gets together: Not on file     Attends Christianity service: Not on file     Active member of club or organization: Not on file     Attends meetings of clubs or organizations: Not on file     Relationship status: Not on file    Intimate partner violence     Fear of current or ex partner: Not on file     Emotionally abused: Not on file     Physically abused: Not on file     Forced sexual activity: Not on file   Other Topics Concern    Not on file   Social History Narrative    Not on file         ALLERGIES: Vioxx [rofecoxib] and Zofran odt [ondansetron]    Review of Systems   All other systems reviewed and are negative. Vitals:    20 0505 20 0510 20 0515 20 0543   BP: 112/80 105/77 110/76 110/82   Pulse: (!) 48 (!) 52 (!) 55 (!) 52   Resp: 13  20 16   Temp:    97.6 °F (36.4 °C)   SpO2: 97% 95% 96% 96%   Weight:                Physical Exam  Vitals signs and nursing note reviewed. Constitutional:       General: He is not in acute distress. HENT:      Head: Normocephalic and atraumatic. Eyes:      General: No scleral icterus. Conjunctiva/sclera: Conjunctivae normal.      Pupils: Pupils are equal, round, and reactive to light.    Neck: Musculoskeletal: No neck rigidity. Trachea: No tracheal deviation. Cardiovascular:      Rate and Rhythm: Tachycardia present. Rhythm irregular. Pulmonary:      Effort: Pulmonary effort is normal. No respiratory distress. Breath sounds: Normal breath sounds. No stridor. Abdominal:      General: There is no distension. Palpations: Abdomen is soft. Tenderness: There is no abdominal tenderness. Genitourinary:     Comments: deferred  Musculoskeletal:         General: No deformity. Skin:     General: Skin is warm and dry. Neurological:      General: No focal deficit present. Mental Status: He is alert. Psychiatric:         Mood and Affect: Mood normal.         Behavior: Behavior normal.          MDM  Number of Diagnoses or Management Options  Atrial fibrillation with rapid ventricular response [facility]):   Diagnosis management comments: Patient initially given rate control with Cardizem in hopes of spontaneous conversion. Patient did not convert. He was consented for conscious sedation and cardioversion. He underwent this without any difficulty. Advised to follow-up closely with cardiologist.  Started on Eliquis. Discussed case with Dr. Yaneth Carrillo with cardiology advised Eliquis for at least 30 days. Patient given return precautions. Discharged in stable and improved condition. Procedural Sedation    Date/Time: 9/30/2020 2:23 PM  Performed by: Warden Alyssa MD  Authorized by: Warden Alyssa MD     Consent:     Consent obtained:  Verbal and written    Consent given by:  Patient    Risks discussed:   Allergic reaction, dysrhythmia, inadequate sedation, nausea, prolonged hypoxia resulting in organ damage, prolonged sedation necessitating reversal, respiratory compromise necessitating ventilatory assistance and intubation and vomiting  Indications:     Procedure performed:  Cardioversion    Procedure necessitating sedation performed by:  Physician performing sedation    Intended level of sedation:  Moderate (conscious sedation)  Pre-sedation assessment:     NPO status caution comment:  5 hrs ago    ASA classification: class 2 - patient with mild systemic disease      Neck mobility: normal      Mouth opening:  3 or more finger widths    Thyromental distance:  4 finger widths    Mallampati score:  I - soft palate, uvula, fauces, pillars visible    Pre-sedation assessments completed and reviewed: airway patency, cardiovascular function, hydration status, mental status, nausea/vomiting, pain level, respiratory function and temperature      History of difficult intubation: no      Pre-sedation assessment completed:  9/28/2020 3:30 AM  Procedure details (see MAR for exact dosages):     Preoxygenation:  Nasal cannula    Sedation:  Etomidate    Intra-procedure monitoring:  Blood pressure monitoring, cardiac monitor, continuous capnometry, continuous pulse oximetry, frequent LOC assessments and frequent vital sign checks    Intra-procedure events: none      Total sedation time (minutes):  13  Cardioversion, electrical    Date/Time: 9/30/2020 2:25 PM  Performed by: Waldo Henley MD  Authorized by: Waldo Henley MD     Consent:     Consent obtained:  Verbal    Consent given by:  Patient    Risks discussed:  Cutaneous burn, death, induced arrhythmia and pain    Alternatives discussed:  No treatment  Pre-procedure details:     Cardioversion basis:  Elective    Rhythm:  Atrial fibrillation    Electrode placement:  Anterior-lateral  Attempt one:     Cardioversion mode:  Synchronous    Waveform:  Biphasic    Shock (Joules):  100    Shock outcome:  Conversion to normal sinus rhythm  Post-procedure details:     Patient status:  Alert    Patient tolerance of procedure:   Tolerated well, no immediate complications

## 2020-09-28 NOTE — ED NOTES
Patient remains in Afib post cardizem administration. Per Dr Jackson Slider, prep patient for cardioversion.

## 2020-09-28 NOTE — ED NOTES
The documentation for this period (3887-8481) is being entered following the guidelines as defined in the [facility] downtime policy by Sable Sacks, RN.

## 2020-09-28 NOTE — ED TRIAGE NOTES
Arrives ambulatory stating he felt an irregular heart rate starting around 2315 last night. Reports hx of afib in 2012. Denies taking any meds for a fib. C/o cp & sob.

## 2020-09-28 NOTE — ED NOTES
Patient dizzy and lightheaded with IV removal.  Patient hypotensive 93/59. Pressure applied to IV site, head of bed flat, given ginger ale.

## 2020-09-28 NOTE — ED NOTES
patient verbalizes he does not want a cardioversion at this time, would like to attempt to convert with medication first.

## 2020-09-28 NOTE — DISCHARGE INSTRUCTIONS
Start Eliquis. Follow-up closely with your cardiologist.  Return to the emergency department for any new or worsening symptoms.

## 2020-09-28 NOTE — LETTER
NOTIFICATION RETURN TO WORK / SCHOOL 
 
9/28/2020 5:53 AM 
 
Mr. Landry Ochoa [facility] [place] [id number] To Whom It May Concern: 
 
Landry Marshall is currently under the care of [facility]Y [facility]. He will return to work/school on: 9/29/2020 If there are questions or concerns please have the patient contact our office. Sincerely, Yulisa Banuelos.  Fang Mays MD

## 2020-09-29 ENCOUNTER — OFFICE VISIT (OUTPATIENT)
Dept: CARDIOLOGY CLINIC | Age: 65
End: 2020-09-29
Payer: COMMERCIAL

## 2020-09-29 VITALS
BODY MASS INDEX: 23.24 KG/M2 | HEART RATE: 64 BPM | WEIGHT: 180 LBS | DIASTOLIC BLOOD PRESSURE: 78 MMHG | SYSTOLIC BLOOD PRESSURE: 122 MMHG

## 2020-09-29 DIAGNOSIS — E78.5 DYSLIPIDEMIA: ICD-10-CM

## 2020-09-29 DIAGNOSIS — I48.0 PAROXYSMAL ATRIAL FIBRILLATION (HCC): Primary | ICD-10-CM

## 2020-09-29 DIAGNOSIS — I48.0 PAROXYSMAL ATRIAL FIBRILLATION (HCC): ICD-10-CM

## 2020-09-29 LAB
ATRIAL RATE: 115 BPM
ATRIAL RATE: 277 BPM
ATRIAL RATE: 51 BPM
CALCULATED P AXIS, ECG09: 54 DEGREES
CALCULATED R AXIS, ECG10: 18 DEGREES
CALCULATED R AXIS, ECG10: 5 DEGREES
CALCULATED R AXIS, ECG10: 8 DEGREES
CALCULATED T AXIS, ECG11: -13 DEGREES
CALCULATED T AXIS, ECG11: -5 DEGREES
CALCULATED T AXIS, ECG11: 4 DEGREES
COMMENT, HOLDF: NORMAL
DIAGNOSIS, 93000: NORMAL
P-R INTERVAL, ECG05: 168 MS
Q-T INTERVAL, ECG07: 312 MS
Q-T INTERVAL, ECG07: 336 MS
Q-T INTERVAL, ECG07: 400 MS
QRS DURATION, ECG06: 80 MS
QRS DURATION, ECG06: 80 MS
QRS DURATION, ECG06: 82 MS
QTC CALCULATION (BEZET), ECG08: 368 MS
QTC CALCULATION (BEZET), ECG08: 392 MS
QTC CALCULATION (BEZET), ECG08: 410 MS
SAMPLES BEING HELD,HOLD: NORMAL
VENTRICULAR RATE, ECG03: 104 BPM
VENTRICULAR RATE, ECG03: 51 BPM
VENTRICULAR RATE, ECG03: 82 BPM

## 2020-09-29 PROCEDURE — 99215 OFFICE O/P EST HI 40 MIN: CPT | Performed by: SPECIALIST

## 2020-09-29 RX ORDER — ASCORBIC ACID 500 MG
500 TABLET ORAL 2 TIMES DAILY
COMMUNITY

## 2020-09-29 NOTE — PROGRESS NOTES
Linda Vega MD. [facility]              Patient: Henry Hurst  : 1955      Today's Date: 2020            HISTORY OF PRESENT ILLNESS:     History of Present Illness:  Went into afib Monday - pulse was fast 110-120 --> went to ED --> was cardioverted. He had slight chest pressure and SOB with Afib. He feels fine now. Has been feeling OK otherwise. PAST MEDICAL HISTORY:     Past Medical History:   Diagnosis Date    Atrial fibrillation [facility])     ER visit on 12 - converted to NSR with Corvert;    Cardioverted in ED again 20     Bradycardia     Started seeing Dr. Jaky Choe and Dr. Yumiko Callejas for this years ago    GERD (gastroesophageal reflux disease)     [place]    Gout     Hypoglycemia     Mixed hyperlipidemia     Nausea & vomiting     PVC (premature ventricular contraction)          Past Surgical History:   Procedure Laterality Date    ECHO STRESS      normal resting LV wall motion and no evidence of ischemia. Gated EF (%): 60-65%    HX GI  1998    Nissen fundiplication    HX GI      polypectomy from colon    HX HERNIA REPAIR      Left IHR, right IHR, hiatal hernia    HX HERNIA REPAIR      HX KNEE ARTHROSCOPY  1972    right    HX OTHER SURGICAL  ,     excision 7 lipomas, excision 3 lipomas,mole excision back    HX OTHER SURGICAL      Echo 12 - Normal    HX UROLOGICAL      epididydectomy left    LOOP MONITOR      no significant arrhythmias infrequent atrial extrasystoles and ventricular extrasystoles.  TOTAL KNEE ARTHROPLASTY  2008    right           MEDICATIONS:     Current Outpatient Medications   Medication Sig Dispense Refill    ascorbic acid, vitamin C, (Vitamin C) 500 mg tablet Take  by mouth.  apixaban (Eliquis) 5 mg tablet Take 1 Tab by mouth two (2) times a day for 30 days. 60 Tab 0    simvastatin (ZOCOR) 40 mg tablet Take 1 Tab by mouth nightly. 90 Tab 3    FERROUS FUMARATE (IRON PO) Take 65 mg by mouth daily. Indications: 0.5 tab      multivitamin, stress formula (STRESS TAB) tablet Take 1 Tab by mouth daily.  omega-3 fatty acids-vitamin e (FISH OIL) 1,000 mg Cap Take 2 Caps by mouth daily. Allergies   Allergen Reactions    Vioxx [Rofecoxib] Other (comments)     Mouth ulcers    Zofran Odt [Ondansetron] Nausea Only             SOCIAL HISTORY:     Social History     Tobacco Use    Smoking status: Former Smoker     Packs/day: 1.00     Years: 12.00     Pack years: 12.00     Last attempt to quit: 3/31/1986     Years since quittin.5    Smokeless tobacco: Never Used   Substance Use Topics    Alcohol use: Yes     Alcohol/week: 1.7 standard drinks     Types: 2 Cans of beer per week    Drug use: No         FAMILY HISTORY:     Family History   Problem Relation Age of Onset    Arrhythmia Father           REVIEW OF SYSTEMS:       Review of Systems:   Constitutional: Negative for fever, chills   HEENT: Negative for vision changes. Respiratory: Negative for cough   Cardiovascular: Negative for orthopnea, syncope, and PND. Gastrointestinal: Negative for abdominal pain, diarrhea, or melena   Genitourinary: Negative for dysuria   Musculoskeletal: Negative for myalgias. Skin: Negative for rash   Heme: No problems bleeding. Neurological: Negative for speech change and focal weakness.                     PHYSICAL EXAM:      Physical Exam:  Visit Vitals  /78 (BP 1 Location: Left arm, BP Patient Position: Sitting)   Pulse 64   Wt 180 lb (81.6 kg)   BMI 23.24 kg/m²          Patient appears generally well, mood and affect are appropriate and pleasant. HEENT:  Hearing intact, non-icteric, normocephalic, atraumatic. Neck Exam: Supple, No JVD or carotid bruits. Lung Exam: Clear to auscultation, even breath sounds. Cardiac Exam: Regular rate and rhythm with no murmur  Abdomen: Soft, non-tender, normal bowel sounds. Extremities: Moves all ext well. No lower extremity edema.   Vascular: 2+ dorsalis pedis pulses bilaterally. Psych: Appropriate affect  Neuro - Grossly intact                 LABS / OTHER STUDIES:           Labs - 7/16/12 - CBC and CMP normal; chol 142, TG 95, LDL 68, HDL 55   Labs 12/11/14 - chol 154, TG 68, HDL 72, LDL 68, Ca 11, K 4.1, Cr 1.07, LFT's OK     Labs followed by PCP             CARDIAC DIAGNOSTICS:       Cardiac Evaluation Includes:  Treadmill stress test 12/2/13 - walked 10:04 (11.8 METS). Sinus bradycardia at rest. Stress  bpm (94% MPHR). No ischemic EKG changes. Frequent PVC's in exercise and recovery (asymptomatic). Echo 12/2/13 - LVEF 60-65%. Mild LAE.         EKG - 7/18/12 - NSR, HR 61, normal   EKG - 9/30/13 - NSR, normal  EKG 11/17/14 - sinus bradycardia, HR 50  EKG 11/30/15 - sinus bradycardia, HR 49  EKG 1/25/17 - sinus bradycardia, HR 55   EKG 1/31/18 - sinus bradycardia, HR 54     EKG 2/13/19 - sinus bradycardia, HR 47  EKG 2/19/20 - sinus janny, HR 47   EKG 9/28/20 - Afib,          ASSESSMENT AND PLAN:       Assessment and Plan:   1) Paroxysmal Afib   - First Afib 2/12 (received Corevert then);   Cardioverted for Afib 9/28/20   - His [id number] score is 0 -- but almost 2 (turns 66 next year)   ---> he needs to continue Eliquis 1 month s/p cardioversion. Since he is almost 74 yo, may be reasonable to continue Coridea indefinitely --- I reviewed risks / benefits and he will consider continuing Coridea   - no standing beta-blocker due to resting bradycardia   - if he has more Afib, then consider AAD  - he is doing great without complaints after cardioversion   - check an echo and exercise cardiolite (need to rule CAD as we consider AAD's going forward)       2) Chronic resting bradycardia   - asymptomatic   - Event monitor in 2007 unremarkable   - Treadmill stress test 12/2/13 - walked 10:04 (11.8 METS). Sinus bradycardia at rest. Stress  bpm (94% MPHR). No ischemic EKG changes. Frequent PVC's in exercise and recovery (asymptomatic).    - He is active without complaints      3) Dyslipidemia   - Cont simvastatin - lipids followed by PCP       4) See me in 6 months. Patient expressed understanding of the plan - questions were answered. He is a pipe filter and . Reggie Contreras has a rescue dog. Daniel Rutherford cruising.          Eduardo Sutherland MD, [address], [address]      [address] [address]  Ph: [phone]                               [phone]       ADDENDUM   10/27/2020    Echo 10/26/20 - LVEF 56%, mild LAE    Exercise Cardiolite 10/26/20 - walked 10 min (13.4 METS), No CP or ischemic EKG changes. Frequent PVC's during exercise (PVC\"s in bigeminy at times). Normal MPI. LVEF 55%    He had similar PVC's with exercise in 2013. Will call pt       ADDENDUM   10/30/2020  I called with results. Regarding high Calcium - he will see PCP to recheck levels.

## 2020-09-29 NOTE — PROGRESS NOTES
Visit Vitals  /78 (BP 1 Location: Left arm, BP Patient Position: Sitting)   Pulse 64   Wt 180 lb (81.6 kg)   BMI 23.24 kg/m²         Pt also taking endomyacin.

## 2020-09-30 LAB
ALBUMIN SERPL-MCNC: 4.7 G/DL (ref 3.8–4.8)
ALBUMIN/GLOB SERPL: 2 {RATIO} (ref 1.2–2.2)
ALP SERPL-CCNC: 74 IU/L (ref 39–117)
ALT SERPL-CCNC: 24 IU/L (ref 0–44)
AST SERPL-CCNC: 21 IU/L (ref 0–40)
BILIRUB SERPL-MCNC: 0.9 MG/DL (ref 0–1.2)
BUN SERPL-MCNC: 17 MG/DL (ref 8–27)
BUN/CREAT SERPL: 18 (ref 10–24)
CALCIUM SERPL-MCNC: 10.8 MG/DL (ref 8.6–10.2)
CHLORIDE SERPL-SCNC: 101 MMOL/L (ref 96–106)
CHOLEST SERPL-MCNC: 152 MG/DL (ref 100–199)
CO2 SERPL-SCNC: 23 MMOL/L (ref 20–29)
CREAT SERPL-MCNC: 0.97 MG/DL (ref 0.76–1.27)
ERYTHROCYTE [DISTWIDTH] IN BLOOD BY AUTOMATED COUNT: 11.6 % (ref 11.6–15.4)
GLOBULIN SER CALC-MCNC: 2.3 G/DL (ref 1.5–4.5)
GLUCOSE SERPL-MCNC: 89 MG/DL (ref 65–99)
HCT VFR BLD AUTO: 46.7 % (ref 37.5–51)
HDLC SERPL-MCNC: 68 MG/DL
HGB BLD-MCNC: 16.2 G/DL (ref 13–17.7)
INTERPRETATION, 910389: NORMAL
LDLC SERPL CALC-MCNC: 70 MG/DL (ref 0–99)
MAGNESIUM SERPL-MCNC: 2 MG/DL (ref 1.6–2.3)
MCH RBC QN AUTO: 31.8 PG (ref 26.6–33)
MCHC RBC AUTO-ENTMCNC: 34.7 G/DL (ref 31.5–35.7)
MCV RBC AUTO: 92 FL (ref 79–97)
PLATELET # BLD AUTO: 245 X10E3/UL (ref 150–450)
POTASSIUM SERPL-SCNC: 4.7 MMOL/L (ref 3.5–5.2)
PROT SERPL-MCNC: 7 G/DL (ref 6–8.5)
RBC # BLD AUTO: 5.09 X10E6/UL (ref 4.14–5.8)
SODIUM SERPL-SCNC: 141 MMOL/L (ref 134–144)
TRIGL SERPL-MCNC: 69 MG/DL (ref 0–149)
TSH SERPL DL<=0.005 MIU/L-ACNC: 1.86 UIU/ML (ref 0.45–4.5)
VLDLC SERPL CALC-MCNC: 14 MG/DL (ref 5–40)
WBC # BLD AUTO: 6 X10E3/UL (ref 3.4–10.8)

## 2020-10-01 ENCOUNTER — TELEPHONE (OUTPATIENT)
Dept: CARDIOLOGY CLINIC | Age: 65
End: 2020-10-01

## 2020-10-01 NOTE — TELEPHONE ENCOUNTER
Per Dr. Melisa Szymanski VO:  VDJ:[birth year]30  Future Appointments   Date Time Provider Mary Dodd   10/26/2020  8:00 AM TITO ROMERO [facility]   10/26/2020  4:00 PM TITO DUKE [facility]   2/24/2021  9:00 AM MD SATNAM Finnegan BS AMB     Requested Prescriptions     Pending Prescriptions Disp Refills    apixaban (Eliquis) 5 mg tablet 180 Tab 3     Sig: Take 1 Tab by mouth two (2) times a day.

## 2020-10-01 NOTE — TELEPHONE ENCOUNTER
Per Dr. Joe Howe: \"Preet Hickman - I set up patient for exercise cardiolite, but I don't think I told him about covid testing--- can you please call him and let him know about covid pre-test \"    Spoke to pt's wife, she was aware. She was asking about the scheduling however.       Nuc is scheduled for 8am, echo is scheduled for 4pm.

## 2020-10-14 DIAGNOSIS — I48.0 PAROXYSMAL ATRIAL FIBRILLATION (HCC): ICD-10-CM

## 2020-10-14 DIAGNOSIS — E78.5 DYSLIPIDEMIA: ICD-10-CM

## 2020-10-21 ENCOUNTER — TRANSCRIBE ORDER (OUTPATIENT)
Dept: REGISTRATION | Age: 65
End: 2020-10-21

## 2020-10-21 ENCOUNTER — HOSPITAL ENCOUNTER (OUTPATIENT)
Dept: LAB | Age: 65
Discharge: HOME | End: 2020-10-21
Payer: COMMERCIAL

## 2020-10-21 DIAGNOSIS — Z01.812 PRE-PROCEDURAL LABORATORY EXAMINATIONS: ICD-10-CM

## 2020-10-21 DIAGNOSIS — Z01.812 PRE-PROCEDURAL LABORATORY EXAMINATIONS: Primary | ICD-10-CM

## 2020-10-21 PROCEDURE — 87635 SARS-COV-2 COVID-19 AMP PRB: CPT

## 2020-10-22 LAB — SARS-COV-2, COV2NT: NOT DETECTED

## 2020-10-26 ENCOUNTER — ANCILLARY PROCEDURE (OUTPATIENT)
Dept: CARDIOLOGY CLINIC | Age: 65
End: 2020-10-26
Payer: COMMERCIAL

## 2020-10-26 VITALS
DIASTOLIC BLOOD PRESSURE: 72 MMHG | BODY MASS INDEX: 23.86 KG/M2 | WEIGHT: 180 LBS | HEIGHT: 73 IN | SYSTOLIC BLOOD PRESSURE: 130 MMHG

## 2020-10-26 VITALS
DIASTOLIC BLOOD PRESSURE: 72 MMHG | WEIGHT: 180 LBS | BODY MASS INDEX: 24.38 KG/M2 | SYSTOLIC BLOOD PRESSURE: 130 MMHG | HEIGHT: 72 IN

## 2020-10-26 DIAGNOSIS — E78.5 DYSLIPIDEMIA: ICD-10-CM

## 2020-10-26 DIAGNOSIS — I48.0 PAROXYSMAL ATRIAL FIBRILLATION (HCC): ICD-10-CM

## 2020-10-26 PROCEDURE — 93306 TTE W/DOPPLER COMPLETE: CPT | Performed by: SPECIALIST

## 2020-10-26 PROCEDURE — 78452 HT MUSCLE IMAGE SPECT MULT: CPT | Performed by: SPECIALIST

## 2020-10-26 PROCEDURE — 93015 CV STRESS TEST SUPVJ I&R: CPT | Performed by: SPECIALIST

## 2020-10-26 PROCEDURE — A9500 TC99M SESTAMIBI: HCPCS

## 2020-10-26 RX ORDER — TETRAKIS(2-METHOXYISOBUTYLISOCYANIDE)COPPER(I) TETRAFLUOROBORATE 1 MG/ML
24.8 INJECTION, POWDER, LYOPHILIZED, FOR SOLUTION INTRAVENOUS ONCE
Status: COMPLETED | OUTPATIENT
Start: 2020-10-26 | End: 2020-10-26

## 2020-10-26 RX ORDER — TETRAKIS(2-METHOXYISOBUTYLISOCYANIDE)COPPER(I) TETRAFLUOROBORATE 1 MG/ML
8.1 INJECTION, POWDER, LYOPHILIZED, FOR SOLUTION INTRAVENOUS ONCE
Status: COMPLETED | OUTPATIENT
Start: 2020-10-26 | End: 2020-10-26

## 2020-10-26 RX ADMIN — TETRAKIS(2-METHOXYISOBUTYLISOCYANIDE)COPPER(I) TETRAFLUOROBORATE 24.8 MILLICURIE: 1 INJECTION, POWDER, LYOPHILIZED, FOR SOLUTION INTRAVENOUS at 10:30

## 2020-10-26 RX ADMIN — TETRAKIS(2-METHOXYISOBUTYLISOCYANIDE)COPPER(I) TETRAFLUOROBORATE 8.1 MILLICURIE: 1 INJECTION, POWDER, LYOPHILIZED, FOR SOLUTION INTRAVENOUS at 08:40

## 2020-10-27 LAB
ECHO AO ROOT DIAM: 3.56 CM
ECHO AV AREA PEAK VELOCITY: 3.79 CM2
ECHO AV AREA VTI: 3.66 CM2
ECHO AV AREA/BSA PEAK VELOCITY: 1.9 CM2/M2
ECHO AV AREA/BSA VTI: 1.8 CM2/M2
ECHO AV MEAN GRADIENT: 2.62 MMHG
ECHO AV PEAK GRADIENT: 4.38 MMHG
ECHO AV PEAK VELOCITY: 104.58 CM/S
ECHO AV VTI: 25.11 CM
ECHO EST RA PRESSURE: 3 MMHG
ECHO LA AREA 4C: 23.81 CM2
ECHO LA MAJOR AXIS: 4.34 CM
ECHO LA MINOR AXIS: 2.14 CM
ECHO LA VOL 2C: 121.98 ML (ref 18–58)
ECHO LA VOL 4C: 74.56 ML (ref 18–58)
ECHO LA VOL BP: 103.14 ML (ref 18–58)
ECHO LA VOL/BSA BIPLANE: 50.89 ML/M2 (ref 16–28)
ECHO LA VOLUME INDEX A2C: 60.18 ML/M2 (ref 16–28)
ECHO LA VOLUME INDEX A4C: 36.79 ML/M2 (ref 16–28)
ECHO LV E' LATERAL VELOCITY: 10.2 CM/S
ECHO LV E' SEPTAL VELOCITY: 6.9 CM/S
ECHO LV EDV A2C: 112.14 ML
ECHO LV EDV A4C: 112.2 ML
ECHO LV EDV BP: 110.66 ML (ref 67–155)
ECHO LV EDV INDEX A4C: 55.4 ML/M2
ECHO LV EDV INDEX BP: 54.6 ML/M2
ECHO LV EDV NDEX A2C: 55.3 ML/M2
ECHO LV EJECTION FRACTION A2C: 55 PERCENT
ECHO LV EJECTION FRACTION A4C: 58 PERCENT
ECHO LV EJECTION FRACTION BIPLANE: 55.9 PERCENT (ref 55–100)
ECHO LV ESV A2C: 50.6 ML
ECHO LV ESV A4C: 46.9 ML
ECHO LV ESV BP: 48.8 ML (ref 22–58)
ECHO LV ESV INDEX A2C: 25 ML/M2
ECHO LV ESV INDEX A4C: 23.1 ML/M2
ECHO LV ESV INDEX BP: 24.1 ML/M2
ECHO LV INTERNAL DIMENSION DIASTOLIC: 5.69 CM (ref 4.2–5.9)
ECHO LV INTERNAL DIMENSION SYSTOLIC: 3.61 CM
ECHO LV IVSD: 1.01 CM (ref 0.6–1)
ECHO LV MASS 2D: 194.1 G (ref 88–224)
ECHO LV MASS INDEX 2D: 95.8 G/M2 (ref 49–115)
ECHO LV POSTERIOR WALL DIASTOLIC: 0.77 CM (ref 0.6–1)
ECHO LVOT DIAM: 2.39 CM
ECHO LVOT PEAK GRADIENT: 3.12 MMHG
ECHO LVOT PEAK VELOCITY: 88.35 CM/S
ECHO LVOT SV: 91.8 ML
ECHO LVOT VTI: 20.48 CM
ECHO MV A VELOCITY: 40.66 CM/S
ECHO MV E DECELERATION TIME (DT): 216.52 MS
ECHO MV E VELOCITY: 44.69 CM/S
ECHO MV E/A RATIO: 1.1
ECHO MV E/E' LATERAL: 4.38
ECHO MV E/E' RATIO (AVERAGED): 5.43
ECHO MV E/E' SEPTAL: 6.48
ECHO MV MAX VELOCITY: 46.27 CM/S
ECHO MV MEAN GRADIENT: 0.29 MMHG
ECHO MV PEAK GRADIENT: 0.86 MMHG
ECHO MV PRESSURE HALF TIME (PHT): 62.79 MS
ECHO MV VTI: 14.53 CM
ECHO RA AREA 4C: 23.59 CM2
ECHO RIGHT VENTRICULAR SYSTOLIC PRESSURE (RVSP): 25 MMHG
ECHO RV INTERNAL DIMENSION: 4.25 CM
ECHO RV TAPSE: 2.62 CM (ref 1.5–2)
ECHO TV REGURGITANT MAX VELOCITY: 233.41 CM/S
ECHO TV REGURGITANT PEAK GRADIENT: 21.79 MMHG
STRESS ANGINA INDEX: 0
STRESS BASELINE DIAS BP: 72 MMHG
STRESS BASELINE HR: 49 BPM
STRESS BASELINE SYS BP: 130 MMHG
STRESS ESTIMATED WORKLOAD: 13.4 METS
STRESS EXERCISE DUR MIN: 1000 MIN:SEC
STRESS O2 SAT PEAK: 99 %
STRESS O2 SAT REST: 99 %
STRESS PEAK DIAS BP: 76 MMHG
STRESS PEAK SYS BP: 168 MMHG
STRESS PERCENT HR ACHIEVED: 101 %
STRESS POST PEAK HR: 157 BPM
STRESS RATE PRESSURE PRODUCT: NORMAL BPM*MMHG
STRESS SR DUKE TREADMILL SCORE: 1000
STRESS ST DEPRESSION: 0 MM
STRESS ST ELEVATION: 0 MM
STRESS TARGET HR: 156 BPM

## 2020-11-04 ENCOUNTER — HOSPITAL ENCOUNTER (EMERGENCY)
Age: 65
Discharge: HOME | End: 2020-11-04
Attending: EMERGENCY MEDICINE
Payer: COMMERCIAL

## 2020-11-04 ENCOUNTER — APPOINTMENT (OUTPATIENT)
Dept: GENERAL RADIOLOGY | Age: 65
End: 2020-11-04
Attending: EMERGENCY MEDICINE
Payer: COMMERCIAL

## 2020-11-04 VITALS
SYSTOLIC BLOOD PRESSURE: 109 MMHG | HEART RATE: 59 BPM | DIASTOLIC BLOOD PRESSURE: 82 MMHG | OXYGEN SATURATION: 96 % | HEIGHT: 72 IN | TEMPERATURE: 98.6 F | RESPIRATION RATE: 19 BRPM | WEIGHT: 180 LBS | BODY MASS INDEX: 24.38 KG/M2

## 2020-11-04 DIAGNOSIS — I48.0 PAROXYSMAL ATRIAL FIBRILLATION (HCC): Primary | ICD-10-CM

## 2020-11-04 LAB
ANION GAP SERPL CALC-SCNC: 5 MMOL/L (ref 5–15)
BASOPHILS # BLD: 0.1 K/UL (ref 0–0.1)
BASOPHILS NFR BLD: 1 % (ref 0–1)
BNP SERPL-MCNC: 813 PG/ML
BUN SERPL-MCNC: 17 MG/DL (ref 6–20)
BUN/CREAT SERPL: 15 (ref 12–20)
CALCIUM SERPL-MCNC: 10.3 MG/DL (ref 8.5–10.1)
CHLORIDE SERPL-SCNC: 107 MMOL/L (ref 97–108)
CO2 SERPL-SCNC: 28 MMOL/L (ref 21–32)
COMMENT, HOLDF: NORMAL
CREAT SERPL-MCNC: 1.12 MG/DL (ref 0.7–1.3)
DIFFERENTIAL METHOD BLD: NORMAL
EOSINOPHIL # BLD: 0.2 K/UL (ref 0–0.4)
EOSINOPHIL NFR BLD: 4 % (ref 0–7)
ERYTHROCYTE [DISTWIDTH] IN BLOOD BY AUTOMATED COUNT: 12.2 % (ref 11.5–14.5)
GLUCOSE SERPL-MCNC: 102 MG/DL (ref 65–100)
HCT VFR BLD AUTO: 48.8 % (ref 36.6–50.3)
HGB BLD-MCNC: 16.3 G/DL (ref 12.1–17)
IMM GRANULOCYTES # BLD AUTO: 0 K/UL (ref 0–0.04)
IMM GRANULOCYTES NFR BLD AUTO: 0 % (ref 0–0.5)
LYMPHOCYTES # BLD: 2 K/UL (ref 0.8–3.5)
LYMPHOCYTES NFR BLD: 35 % (ref 12–49)
MAGNESIUM SERPL-MCNC: 1.9 MG/DL (ref 1.6–2.4)
MCH RBC QN AUTO: 31.3 PG (ref 26–34)
MCHC RBC AUTO-ENTMCNC: 33.4 G/DL (ref 30–36.5)
MCV RBC AUTO: 93.8 FL (ref 80–99)
MONOCYTES # BLD: 0.6 K/UL (ref 0–1)
MONOCYTES NFR BLD: 10 % (ref 5–13)
NEUTS SEG # BLD: 2.8 K/UL (ref 1.8–8)
NEUTS SEG NFR BLD: 50 % (ref 32–75)
NRBC # BLD: 0 K/UL (ref 0–0.01)
NRBC BLD-RTO: 0 PER 100 WBC
PLATELET # BLD AUTO: 222 K/UL (ref 150–400)
PMV BLD AUTO: 10 FL (ref 8.9–12.9)
POTASSIUM SERPL-SCNC: 4.4 MMOL/L (ref 3.5–5.1)
RBC # BLD AUTO: 5.2 M/UL (ref 4.1–5.7)
SAMPLES BEING HELD,HOLD: NORMAL
SODIUM SERPL-SCNC: 140 MMOL/L (ref 136–145)
TROPONIN I SERPL-MCNC: <0.05 NG/ML
TSH SERPL DL<=0.05 MIU/L-ACNC: 1.87 UIU/ML (ref 0.36–3.74)
WBC # BLD AUTO: 5.7 K/UL (ref 4.1–11.1)

## 2020-11-04 PROCEDURE — 77030018729 HC ELECTRD DEFIB PAD CARD -B

## 2020-11-04 PROCEDURE — 36415 COLL VENOUS BLD VENIPUNCTURE: CPT

## 2020-11-04 PROCEDURE — 93005 ELECTROCARDIOGRAM TRACING: CPT

## 2020-11-04 PROCEDURE — 96374 THER/PROPH/DIAG INJ IV PUSH: CPT

## 2020-11-04 PROCEDURE — 74011000250 HC RX REV CODE- 250: Performed by: EMERGENCY MEDICINE

## 2020-11-04 PROCEDURE — 84443 ASSAY THYROID STIM HORMONE: CPT

## 2020-11-04 PROCEDURE — 84484 ASSAY OF TROPONIN QUANT: CPT

## 2020-11-04 PROCEDURE — 85025 COMPLETE CBC W/AUTO DIFF WBC: CPT

## 2020-11-04 PROCEDURE — 99285 EMERGENCY DEPT VISIT HI MDM: CPT

## 2020-11-04 PROCEDURE — 80048 BASIC METABOLIC PNL TOTAL CA: CPT

## 2020-11-04 PROCEDURE — 71045 X-RAY EXAM CHEST 1 VIEW: CPT

## 2020-11-04 PROCEDURE — 92960 CARDIOVERSION ELECTRIC EXT: CPT

## 2020-11-04 PROCEDURE — 83880 ASSAY OF NATRIURETIC PEPTIDE: CPT

## 2020-11-04 PROCEDURE — 83735 ASSAY OF MAGNESIUM: CPT

## 2020-11-04 RX ORDER — ETOMIDATE 2 MG/ML
0.1 INJECTION INTRAVENOUS
Status: COMPLETED | OUTPATIENT
Start: 2020-11-04 | End: 2020-11-04

## 2020-11-04 RX ORDER — ACETAMINOPHEN 500 MG
1000 TABLET ORAL
COMMUNITY

## 2020-11-04 RX ORDER — LANOLIN ALCOHOL/MO/W.PET/CERES
32.5 CREAM (GRAM) TOPICAL
COMMUNITY
End: 2020-11-11

## 2020-11-04 RX ADMIN — ETOMIDATE 8.16 MG: 2 INJECTION, SOLUTION INTRAVENOUS at 10:27

## 2020-11-04 NOTE — ADVANCED PRACTICE NURSE
Cardiology follow up arranged:      Follow-up Information     Follow up With Specialties Details Why Contact Info    Jelly Duke MD Cardiology On 11/11/2020 9:40 am  [address]  [address] [facility],[address]  [address]  [phone]

## 2020-11-04 NOTE — ED TRIAGE NOTES
Pt started feeling \"not quite right\" last night around 9 PM. Woke up this AM and his HR was 100's. Recently diagnosed with Afib and has been on Eliquis for 1 month. Normal HR is 58-62 per pt.

## 2020-11-04 NOTE — LETTER
[address] 
[facility] EMERGENCY DEPT 
[address] [facility] [place] [address] [phone] Work/School Note Date: 11/4/2020 To Whom It May concern: 
 
Leticia Pereira was seen and treated today in the emergency room by the following provider(s): 
Attending Provider: Matias Calderon MD. Leticia Pereira may return to work on 11/5/2020 Sincerely, Reny Oleary RN

## 2020-11-04 NOTE — ED PROVIDER NOTES
52-year-old male with a history of paroxysmal A. fib, hyperlipidemia presents with palpitations that started last night at approximately 9 PM.  He states he has a little bit of shortness of breath and difficulty getting a deep breath. He denies any chest pain. He denies any leg swelling. Denies any fever, chills, cough. He has been taking Eliquis since his last visit here approximately a month ago in which she was cardioverted and put on Eliquis. He had a recent stress test that was normal.  His normal heart rate is approximately 50 to 62 bpm.      Palpitations           Past Medical History:   Diagnosis Date    Atrial fibrillation [facility])     ER visit on 2/16/12 - converted to NSR with Corvert;    Cardioverted in ED again 9/28/20     Bradycardia     Started seeing Dr. Max Talbot and Dr. Davey Pompa for this years ago    GERD (gastroesophageal reflux disease) 1998    [place]    Gout     Hypoglycemia     Mixed hyperlipidemia     Nausea & vomiting     PVC (premature ventricular contraction)        Past Surgical History:   Procedure Laterality Date    ECHO STRESS  2007    normal resting LV wall motion and no evidence of ischemia. Gated EF (%): 60-65%    HX GI  1998    Nissen fundiplication    HX GI      polypectomy from colon    HX HERNIA REPAIR      Left IHR, right IHR, hiatal hernia    HX HERNIA REPAIR      HX KNEE ARTHROSCOPY  1972    right    HX OTHER SURGICAL  1989, 2005    excision 7 lipomas, excision 3 lipomas,mole excision back    HX OTHER SURGICAL      Echo 2/17/12 - Normal    HX UROLOGICAL      epididydectomy left    LOOP MONITOR  2007    no significant arrhythmias infrequent atrial extrasystoles and ventricular extrasystoles.      TOTAL KNEE ARTHROPLASTY  2008    right         Family History:   Problem Relation Age of Onset    Arrhythmia Father        Social History     Socioeconomic History    Marital status:      Spouse name: Not on file    Number of children: Not on file    Years of education: Not on file    Highest education level: Not on file   Occupational History    Not on file   Social Needs    Financial resource strain: Not on file    Food insecurity     Worry: Not on file     Inability: Not on file    Transportation needs     Medical: Not on file     Non-medical: Not on file   Tobacco Use    Smoking status: Former Smoker     Packs/day: 1.00     Years: 12.00     Pack years: 12.00     Last attempt to quit: 3/31/1986     Years since quittin.6    Smokeless tobacco: Never Used   Substance and Sexual Activity    Alcohol use: Yes     Alcohol/week: 1.7 standard drinks     Types: 2 Cans of beer per week    Drug use: No    Sexual activity: Yes     Partners: Female   Lifestyle    Physical activity     Days per week: Not on file     Minutes per session: Not on file    Stress: Not on file   Relationships    Social connections     Talks on phone: Not on file     Gets together: Not on file     Attends Mandaeism service: Not on file     Active member of club or organization: Not on file     Attends meetings of clubs or organizations: Not on file     Relationship status: Not on file    Intimate partner violence     Fear of current or ex partner: Not on file     Emotionally abused: Not on file     Physically abused: Not on file     Forced sexual activity: Not on file   Other Topics Concern    Not on file   Social History Narrative    Not on file         ALLERGIES: Vioxx [rofecoxib] and Zofran odt [ondansetron]    Review of Systems   Cardiovascular: Positive for palpitations. All other systems reviewed and are negative. Vitals:    20 0737   BP: 100/69   Pulse: 100   Resp: 15   SpO2: 96%   Weight: 81.6 kg (180 lb)   Height: 5' 11.5\" (1.816 m)            Physical Exam  Vitals signs and nursing note reviewed. Constitutional:       General: He is not in acute distress. HENT:      Head: Normocephalic and atraumatic. Eyes:      General: No scleral icterus. Conjunctiva/sclera: Conjunctivae normal.      Pupils: Pupils are equal, round, and reactive to light. Neck:      Musculoskeletal: No neck rigidity. Trachea: No tracheal deviation. Cardiovascular:      Rate and Rhythm: Tachycardia present. Rhythm irregular. Comments: Mild tachycardia, irregularly irregular  Pulmonary:      Effort: Pulmonary effort is normal. No respiratory distress. Breath sounds: Normal breath sounds. No stridor. Abdominal:      General: There is no distension. Palpations: Abdomen is soft. Tenderness: There is no abdominal tenderness. Genitourinary:     Comments: deferred  Musculoskeletal:         General: No deformity. Right lower leg: No edema. Left lower leg: No edema. Skin:     General: Skin is warm and dry. Neurological:      General: No focal deficit present. Mental Status: He is alert and oriented to person, place, and time. Psychiatric:         Mood and Affect: Mood normal.         Behavior: Behavior normal.          [facility]  ED Course as of Nov 04 1058 Wed Nov 04, 2020   9802 Spoke to Dr. Wanda Vaughn who agrees with plan. He will arrange close follow-up. [TT]      ED Course User Index  [TT] Annette Dick MD       Procedural Sedation    Date/Time: 11/4/2020 10:58 AM  Performed by: Annette Dick MD  Authorized by: Annette Dick MD     Consent:     Consent obtained:  Verbal and written    Consent given by:  Patient    Risks discussed:   Allergic reaction, dysrhythmia, inadequate sedation, nausea, prolonged hypoxia resulting in organ damage, prolonged sedation necessitating reversal, respiratory compromise necessitating ventilatory assistance and intubation and vomiting  Indications:     Procedure performed:  Cardioversion    Procedure necessitating sedation performed by:  Physician performing sedation    Intended level of sedation:  Moderate (conscious sedation)  Pre-sedation assessment:     Time since last food or drink:  9pm    ASA classification: class 2 - patient with mild systemic disease      Neck mobility: normal      Mouth opening:  3 or more finger widths    Thyromental distance:  4 finger widths    Mallampati score:  I - soft palate, uvula, fauces, pillars visible    Pre-sedation assessments completed and reviewed: airway patency, cardiovascular function, hydration status, mental status, nausea/vomiting, pain level, respiratory function and temperature      History of difficult intubation: no      Pre-sedation assessment completed:  11/4/2020 10:20 AM  Immediate pre-procedure details:     Reassessment: Patient reassessed immediately prior to procedure      Reviewed: vital signs      Verified: bag valve mask available, emergency equipment available, intubation equipment available, IV patency confirmed, oxygen available and suction available    Procedure details (see MAR for exact dosages):     Sedation start time:  11/4/2020 10:27 AM    Sedation end time:  11/4/2020 10:40 AM  Post-procedure details:     Post-sedation assessment completed:  11/4/2020 11:06 AM    Attendance: Constant attendance by certified staff until patient recovered      Recovery: Patient returned to pre-procedure baseline      Post-sedation assessments completed and reviewed: airway patency, cardiovascular function, hydration status, mental status, nausea/vomiting, pain level, respiratory function and temperature      Patient is stable for discharge or admission: yes      Patient tolerance:   Tolerated well, no immediate complications    Cardioversion, electrical    Date/Time: 11/5/2020 8:54 AM  Performed by: Neo Sylvester MD  Authorized by: Neo Sylvester MD     Consent:     Consent obtained:  Verbal and written    Consent given by:  Patient    Risks discussed:  Death, cutaneous burn, induced arrhythmia and pain    Alternatives discussed:  No treatment  Pre-procedure details:     Cardioversion basis:  Elective    Rhythm:  Atrial fibrillation  Attempt one:     Cardioversion mode:  Synchronous    Waveform:  Biphasic    Shock (Joules):  100    Shock outcome:  Conversion to normal sinus rhythm  Post-procedure details:     Patient status:  Awake    Patient tolerance of procedure: Tolerated well, no immediate complications        1334 EKG shows atrial fibrillation at rate of 95, normal intervals, normal axis, nonspecific T wave abnormality.       10:36 repeat EKG shows sinus bradycardia rate of 44, normal intervals, normal axis, normal ST/T

## 2020-11-04 NOTE — PROGRESS NOTES
[facility]: MED RECONCILIATION    Comments/Recommendations:   Reviewed PTA medications with patient at bedside, confirmed home pharmacy  Patient has not yet taken apixban this AM, asked patient to hold off pending cardiologist instructions in hospital. Last dose 11/3 PM.    Information obtained from: Patient, RxQuery, Chart review    Allergies: Vioxx [rofecoxib] and Zofran odt [ondansetron]    Prior to Admission Medications:     Medication Documentation Review Audit       Reviewed by ARIANA MusaD (Pharmacist) on 11/04/20 at 0830      Medication Sig Documenting Provider Last Dose Status Taking?   acetaminophen (TYLENOL) 500 mg tablet Take 1,000 mg by mouth every eight (8) hours as needed for Pain. Provider, Historical 10/28/2020 Active Yes   apixaban (Eliquis) 5 mg tablet Take 1 Tab by mouth two (2) times a day. Mynor Theodore MD 11/3/2020 2100 Active Yes   ascorbic acid, vitamin C, (Vitamin C) 500 mg tablet Take 500 mg by mouth two (2) times a day. Provider, Historical 11/3/2020 PM Active Yes   ferrous sulfate 325 mg (65 mg iron) tablet Take 32.5 mg by mouth Daily (before breakfast). Provider, Historical 11/3/2020 Active Yes   multivitamin, stress formula (STRESS TAB) tablet Take 1 Tab by mouth daily. Provider, Historical 11/3/2020 Active Yes   omega-3 fatty acids-vitamin e (FISH OIL) 1,000 mg Cap Take 1 Cap by mouth two (2) times a day. Provider, Historical 11/3/2020 PM Active Yes   simvastatin (ZOCOR) 40 mg tablet Take 1 Tab by mouth nightly.  Mynor Theodore MD 11/3/2020 Active Yes                  [facility]   Contact: [phone]

## 2020-11-08 LAB
ATRIAL RATE: 44 BPM
CALCULATED P AXIS, ECG09: 51 DEGREES
CALCULATED R AXIS, ECG10: 16 DEGREES
CALCULATED T AXIS, ECG11: 7 DEGREES
DIAGNOSIS, 93000: NORMAL
P-R INTERVAL, ECG05: 168 MS
Q-T INTERVAL, ECG07: 414 MS
QRS DURATION, ECG06: 84 MS
QTC CALCULATION (BEZET), ECG08: 353 MS
VENTRICULAR RATE, ECG03: 44 BPM

## 2020-11-10 LAB
ATRIAL RATE: 113 BPM
CALCULATED R AXIS, ECG10: 49 DEGREES
CALCULATED T AXIS, ECG11: -4 DEGREES
DIAGNOSIS, 93000: NORMAL
Q-T INTERVAL, ECG07: 344 MS
QRS DURATION, ECG06: 76 MS
QTC CALCULATION (BEZET), ECG08: 432 MS
VENTRICULAR RATE, ECG03: 95 BPM

## 2020-11-11 ENCOUNTER — OFFICE VISIT (OUTPATIENT)
Dept: CARDIOLOGY CLINIC | Age: 65
End: 2020-11-11
Payer: COMMERCIAL

## 2020-11-11 VITALS
BODY MASS INDEX: 24.65 KG/M2 | DIASTOLIC BLOOD PRESSURE: 65 MMHG | HEART RATE: 60 BPM | WEIGHT: 182 LBS | HEIGHT: 72 IN | SYSTOLIC BLOOD PRESSURE: 122 MMHG

## 2020-11-11 DIAGNOSIS — I48.0 PAROXYSMAL ATRIAL FIBRILLATION (HCC): Primary | ICD-10-CM

## 2020-11-11 DIAGNOSIS — E78.2 MIXED HYPERLIPIDEMIA: ICD-10-CM

## 2020-11-11 DIAGNOSIS — I49.3 PVC (PREMATURE VENTRICULAR CONTRACTION): ICD-10-CM

## 2020-11-11 PROCEDURE — 99214 OFFICE O/P EST MOD 30 MIN: CPT | Performed by: SPECIALIST

## 2020-11-11 NOTE — PROGRESS NOTES
Gilson Zaidi MD. [facility]              Patient: Yulia Hawk  : 1955      Today's Date: 2020          HISTORY OF PRESENT ILLNESS:     History of Present Illness: On election night he noted heart irregular - went to sleep but next AM, remained SOB and just not feeling well - HR was 85  -- went to ED when EKG showed Afib. Was cardioverted and felt fine afterwards. PAST MEDICAL HISTORY:     Past Medical History:   Diagnosis Date    Atrial fibrillation [facility])     ER visit on 12 - converted to NSR with Corvert;    Cardioverted in ED again 20     Bradycardia     Started seeing Dr. Lazarus Carranza and Dr. Connie Sparrow for this years ago    GERD (gastroesophageal reflux disease)     [place]    Gout     Hypoglycemia     Mixed hyperlipidemia     Nausea & vomiting     PVC (premature ventricular contraction)          Past Surgical History:   Procedure Laterality Date    ECHO STRESS      normal resting LV wall motion and no evidence of ischemia. Gated EF (%): 60-65%    HX GI      Nissen fundiplication    HX GI      polypectomy from colon    HX HERNIA REPAIR      Left IHR, right IHR, hiatal hernia    HX HERNIA REPAIR      HX KNEE ARTHROSCOPY  1972    right    HX OTHER SURGICAL  ,     excision 7 lipomas, excision 3 lipomas,mole excision back    HX OTHER SURGICAL      Echo 12 - Normal    HX UROLOGICAL      epididydectomy left    LOOP MONITOR      no significant arrhythmias infrequent atrial extrasystoles and ventricular extrasystoles.  TOTAL KNEE ARTHROPLASTY  2008    right           MEDICATIONS:     Current Outpatient Medications   Medication Sig Dispense Refill    acetaminophen (TYLENOL) 500 mg tablet Take 1,000 mg by mouth every eight (8) hours as needed for Pain.  apixaban (Eliquis) 5 mg tablet Take 1 Tab by mouth two (2) times a day.  180 Tab 3    ascorbic acid, vitamin C, (Vitamin C) 500 mg tablet Take 500 mg by mouth two (2) times a day.      simvastatin (ZOCOR) 40 mg tablet Take 1 Tab by mouth nightly. 90 Tab 3    multivitamin, stress formula (STRESS TAB) tablet Take 1 Tab by mouth daily.  omega-3 fatty acids-vitamin e (FISH OIL) 1,000 mg Cap Take 1 Cap by mouth two (2) times a day. Allergies   Allergen Reactions    Vioxx [Rofecoxib] Other (comments)     Mouth ulcers    Zofran Odt [Ondansetron] Nausea Only             SOCIAL HISTORY:     Social History     Tobacco Use    Smoking status: Former Smoker     Packs/day: 1.00     Years: 12.00     Pack years: 12.00     Last attempt to quit: 3/31/1986     Years since quittin.6    Smokeless tobacco: Never Used   Substance Use Topics    Alcohol use: Yes     Alcohol/week: 1.7 standard drinks     Types: 2 Cans of beer per week    Drug use: No         FAMILY HISTORY:     Family History   Problem Relation Age of Onset    Arrhythmia Father                 REVIEW OF SYSTEMS:       Review of Systems:   Constitutional: Negative for fever, chills   HEENT: Negative for vision changes. Respiratory: Negative for cough   Cardiovascular: Negative for orthopnea, syncope, and PND. Gastrointestinal: Negative for abdominal pain, diarrhea, or melena   Genitourinary: Negative for dysuria   Musculoskeletal: Negative for myalgias. Skin: Negative for rash   Heme: No problems bleeding. Neurological: Negative for speech change and focal weakness.                     PHYSICAL EXAM:      Physical Exam:  Visit Vitals  /65   Pulse 60   Ht 5' 11.5\" (1.816 m)   Wt 182 lb (82.6 kg)   BMI 25.03 kg/m²          Patient appears generally well, mood and affect are appropriate and pleasant. HEENT:  Hearing intact, non-icteric, normocephalic, atraumatic. Neck Exam: Supple, No JVD or carotid bruits. Lung Exam: Clear to auscultation, even breath sounds. Cardiac Exam: Regular rate and rhythm with no murmur  Abdomen: Soft, non-tender, normal bowel sounds.    Extremities: Moves all ext well.  No lower extremity edema. No major rashes  Good ROM Ext  Psych: Appropriate affect  Neuro - Grossly intact                 LABS / OTHER STUDIES:           Labs - 7/16/12 - CBC and CMP normal; chol 142, TG 95, LDL 68, HDL 55   Labs 12/11/14 - chol 154, TG 68, HDL 72, LDL 68, Ca 11, K 4.1, Cr 1.07, LFT's OK     Lab Results   Component Value Date/Time    Sodium 140 11/04/2020 07:50 AM    Potassium 4.4 11/04/2020 07:50 AM    Chloride 107 11/04/2020 07:50 AM    CO2 28 11/04/2020 07:50 AM    Anion gap 5 11/04/2020 07:50 AM    Glucose 102 (H) 11/04/2020 07:50 AM    BUN 17 11/04/2020 07:50 AM    Creatinine 1.12 11/04/2020 07:50 AM    BUN/Creatinine ratio 15 11/04/2020 07:50 AM    GFR est AA >60 11/04/2020 07:50 AM    GFR est non-AA >60 11/04/2020 07:50 AM    Calcium 10.3 (H) 11/04/2020 07:50 AM    Bilirubin, total 0.9 09/29/2020 12:00 AM    Alk.  phosphatase 74 09/29/2020 12:00 AM    Protein, total 7.0 09/29/2020 12:00 AM    Albumin 4.7 09/29/2020 12:00 AM    Globulin 2.8 11/10/2013 10:35 PM    A-G Ratio 2.0 09/29/2020 12:00 AM    ALT (SGPT) 24 09/29/2020 12:00 AM    AST (SGOT) 21 09/29/2020 12:00 AM     Lab Results   Component Value Date/Time    WBC 5.7 11/04/2020 07:50 AM    Hemoglobin (POC) 15.3 02/16/2012 03:09 PM    HGB 16.3 11/04/2020 07:50 AM    Hematocrit (POC) 45 02/16/2012 03:09 PM    HCT 48.8 11/04/2020 07:50 AM    PLATELET 861 75/24/2466 07:50 AM    MCV 93.8 11/04/2020 07:50 AM     Lab Results   Component Value Date/Time    Cholesterol, total 152 09/29/2020 12:00 AM    HDL Cholesterol 68 09/29/2020 12:00 AM    LDL, calculated 76 10/02/2013 12:00 AM    LDL Chol Calc (NIH) 70 09/29/2020 12:00 AM    VLDL, calculated 14 10/02/2013 12:00 AM    VLDL Cholesterol Eddie 14 09/29/2020 12:00 AM    Triglyceride 69 09/29/2020 12:00 AM     Lab Results   Component Value Date/Time    TSH 1.87 11/04/2020 07:50 AM                  CARDIAC DIAGNOSTICS:       Cardiac Evaluation Includes:  Treadmill stress test 12/2/13 - walked 10:04 (11.8 METS). Sinus bradycardia at rest. Stress  bpm (94% MPHR). No ischemic EKG changes. Frequent PVC's in exercise and recovery (asymptomatic). Echo 12/2/13 - LVEF 60-65%. Mild LAE. Echo 10/26/20 - LVEF 56%, mild LAE     Exercise Cardiolite 10/26/20 - walked 10 min (13.4 METS), No CP or ischemic EKG changes. Frequent PVC's during exercise (PVC\"s in bigeminy at times). Normal MPI. LVEF 55% --- He had similar PVC's with exercise in 2013.           EKG - 7/18/12 - NSR, HR 61, normal   EKG - 9/30/13 - NSR, normal  EKG 11/17/14 - sinus bradycardia, HR 50  EKG 11/30/15 - sinus bradycardia, HR 49  EKG 1/25/17 - sinus bradycardia, HR 55   EKG 1/31/18 - sinus bradycardia, HR 54     EKG 2/13/19 - sinus bradycardia, HR 47  EKG 2/19/20 - sinus janny, HR 47   EKG 9/28/20 - Afib,   EKG 11/4/20 - Afib, HR 95   EKG 11/4/20  - sinus janny, HR 44 -- after [facility]           ASSESSMENT AND PLAN:       Assessment and Plan:   1) Paroxysmal Afib   - First Afib 2/12 (received Corevert then);     - Cardioverted for Afib 9/28/20   - Cardioverted again for Afib 11/4/20   - His [id number] score is 0 -- but almost 1 (turns 66 next year) ---> continue [facility]  - Discussed options --- AAD vs ablation - he wants to wait on intervention until he has more Afib --->  will have him see EP to get established and review options    - Given that baseline HR is slow, it is more challenging to add AAD      2) Chronic resting bradycardia   - asymptomatic   - Event monitor in 2007 unremarkable   - Has normal HR response to exercise during stress tests   - He is active without complaints      3) Dyslipidemia   - Cont simvastatin - lipids followed by PCP     4) High Calcium   - asked him to see PCP     5)  See me in 6 months. Patient expressed understanding of the plan - questions were answered. He is a pipe filter and . Angelica Boas has a rescue dog. Allie Barnes cruising.           Regarding high Calcium - see PCP       Olimpia Tsai MD, [address]  [address] [place] [address], [address] [place] [address]  Ph: [phone]   Ph [phone]

## 2020-11-11 NOTE — LETTER
11/11/2020 10:31 AM 
 
Mr. Carlos Ochoa [address] [place] [id number] To Whom It Concerns,  
 
Mr. Melisa Jim was seen in the office today for an appointment. Sincerely, Blanco Marino MD

## 2020-11-12 ENCOUNTER — TELEPHONE (OUTPATIENT)
Dept: CARDIOLOGY CLINIC | Age: 65
End: 2020-11-12

## 2020-11-12 NOTE — TELEPHONE ENCOUNTER
Pt states he has [employer] to fax some FMLA papers to Dr. Luis Eduardo Britt to fill out.  Please advise        BROWNNP:[phone]

## 2020-11-18 ENCOUNTER — OFFICE VISIT (OUTPATIENT)
Dept: CARDIOLOGY CLINIC | Age: 65
End: 2020-11-18
Payer: COMMERCIAL

## 2020-11-18 VITALS
BODY MASS INDEX: 25.14 KG/M2 | DIASTOLIC BLOOD PRESSURE: 84 MMHG | WEIGHT: 185.6 LBS | SYSTOLIC BLOOD PRESSURE: 132 MMHG | RESPIRATION RATE: 16 BRPM | HEART RATE: 56 BPM | OXYGEN SATURATION: 96 % | HEIGHT: 72 IN

## 2020-11-18 DIAGNOSIS — E78.2 MIXED HYPERLIPIDEMIA: ICD-10-CM

## 2020-11-18 DIAGNOSIS — I49.3 PVC (PREMATURE VENTRICULAR CONTRACTION): Primary | ICD-10-CM

## 2020-11-18 DIAGNOSIS — R00.1 BRADYCARDIA: ICD-10-CM

## 2020-11-18 DIAGNOSIS — I48.0 PAROXYSMAL ATRIAL FIBRILLATION (HCC): ICD-10-CM

## 2020-11-18 PROCEDURE — 99215 OFFICE O/P EST HI 40 MIN: CPT | Performed by: INTERNAL MEDICINE

## 2020-11-18 NOTE — LETTER
NOTIFICATION RETURN TO WORK  
 
11/18/2020 4:17 PM 
 
Mr. Zackary Ochoa [address] [place] [id number] To Whom It May Concern: 
 
Zackary Guerrero is currently under the care of [address] He will return to work on: 11/18/20. If there are questions or concerns please have the patient contact our office. Sincerely, Lily Alonzo MD

## 2020-11-18 NOTE — PROGRESS NOTES
HISTORY OF PRESENTING ILLNESS      Zackary Guerrero is a 60 y.o. male with dyslipidemia, bradycardia who has been referred for further management of his symptomatic PAF. He was first diagnosed in 2012 during an ER visit for palpitations. He was cardioverted then, as well as 9/2020 and most recently 11/4/2020. He is on Eliquis currently although his Chads Vasc is 0. Medical therapy has been prohibited due to resting bradycardia. PAST MEDICAL HISTORY     Past Medical History:   Diagnosis Date    Atrial fibrillation [facility])     ER visit on 2/16/12 - converted to NSR with [facility];    Cardioverted in ED again 9/28/20. 2nd [facility] on 11/4/20    Bradycardia     Started seeing Dr. Vanessa Jain and Dr. Ulysses Meehan for this years ago    GERD (gastroesophageal reflux disease) 1998    [place]    Gout     Hypoglycemia     Mixed hyperlipidemia     Nausea & vomiting     PVC (premature ventricular contraction)            PAST SURGICAL HISTORY     Past Surgical History:   Procedure Laterality Date    ECHO STRESS  2007    normal resting LV wall motion and no evidence of ischemia. Gated EF (%): 60-65%    HX GI  1998    Nissen fundiplication    HX GI      polypectomy from colon    HX HERNIA REPAIR      Left IHR, right IHR, hiatal hernia    HX HERNIA REPAIR      HX KNEE ARTHROSCOPY  1972    right    HX OTHER SURGICAL  1989, 2005    excision 7 lipomas, excision 3 lipomas,mole excision back    HX OTHER SURGICAL      Echo 2/17/12 - Normal    HX UROLOGICAL      epididydectomy left    LOOP MONITOR  2007    no significant arrhythmias infrequent atrial extrasystoles and ventricular extrasystoles.      TOTAL KNEE ARTHROPLASTY  2008    right          ALLERGIES     Allergies   Allergen Reactions    Vioxx [Rofecoxib] Other (comments)     Mouth ulcers    Zofran Odt [Ondansetron] Nausea Only          FAMILY HISTORY     Family History   Problem Relation Age of Onset    Arrhythmia Father     negative for cardiac disease SOCIAL HISTORY     Social History     Socioeconomic History    Marital status:      Spouse name: Not on file    Number of children: Not on file    Years of education: Not on file    Highest education level: Not on file   Tobacco Use    Smoking status: Former Smoker     Packs/day: 1.00     Years: 12.00     Pack years: 12.00     Last attempt to quit: 3/31/1986     Years since quittin.6    Smokeless tobacco: Never Used   Substance and Sexual Activity    Alcohol use: Yes     Alcohol/week: 10.0 standard drinks     Types: 10 Cans of beer per week    Drug use: No    Sexual activity: Yes     Partners: Female         MEDICATIONS     Current Outpatient Medications   Medication Sig    acetaminophen (TYLENOL) 500 mg tablet Take 1,000 mg by mouth every eight (8) hours as needed for Pain.  apixaban (Eliquis) 5 mg tablet Take 1 Tab by mouth two (2) times a day.  ascorbic acid, vitamin C, (Vitamin C) 500 mg tablet Take 500 mg by mouth two (2) times a day.  simvastatin (ZOCOR) 40 mg tablet Take 1 Tab by mouth nightly.  multivitamin, stress formula (STRESS TAB) tablet Take 1 Tab by mouth daily.  omega-3 fatty acids-vitamin e (FISH OIL) 1,000 mg Cap Take 1 Cap by mouth two (2) times a day. No current facility-administered medications for this visit. I have reviewed the nurses notes, vitals, problem list, allergy list, medical history, family, social history and medications. REVIEW OF SYMPTOMS      General: Pt denies excessive weight gain or loss. Pt is able to conduct ADL's  HEENT: Denies blurred vision, headaches, hearing loss, epistaxis and difficulty swallowing. Respiratory: Denies cough, congestion, shortness of breath, ROSENBERG, wheezing or stridor.   Cardiovascular: Denies precordial pain, palpitations, edema or PND  Gastrointestinal: Denies poor appetite, indigestion, abdominal pain or blood in stool  Genitourinary: Denies hematuria, dysuria, increased urinary frequency  Musculoskeletal: Denies joint pain or swelling from muscles or joints  Neurologic: Denies tremor, paresthesias, headache, or sensory motor disturbance  Psychiatric: Denies confusion, insomnia, depression  Integumentray: Denies rash, itching or ulcers. Hematologic: Denies easy bruising, bleeding       PHYSICAL EXAMINATION      Vitals: see vitals section  General: Well developed, in no acute distress. HEENT: No jaundice, oral mucosa moist, no oral ulcers  Neck: Supple, no stiffness, no lymphadenopathy, supple  Heart:  Normal S1/S2 negative S3 or S4. Regular, no murmur, gallop or rub, no jugular venous distention  Respiratory: Clear bilaterally x 4, no wheezing or rales  Abdomen:   Soft, non-tender, bowel sounds are active. Extremities:  No edema, normal cap refill, no cyanosis. Musculoskeletal: No clubbing, no deformities  Neuro: A&Ox3, speech clear, gait stable, cooperative, no focal neurologic deficits  Skin: Skin color is normal. No rashes or lesions.  Non diaphoretic, moist.  Vascular: 2+ pulses symmetric in all extremities       DIAGNOSTIC DATA      EKG:        LABORATORY DATA      Lab Results   Component Value Date/Time    WBC 5.7 11/04/2020 07:50 AM    Hemoglobin (POC) 15.3 02/16/2012 03:09 PM    HGB 16.3 11/04/2020 07:50 AM    Hematocrit (POC) 45 02/16/2012 03:09 PM    HCT 48.8 11/04/2020 07:50 AM    PLATELET 777 66/50/7406 07:50 AM    MCV 93.8 11/04/2020 07:50 AM      Lab Results   Component Value Date/Time    Sodium 140 11/04/2020 07:50 AM    Potassium 4.4 11/04/2020 07:50 AM    Chloride 107 11/04/2020 07:50 AM    CO2 28 11/04/2020 07:50 AM    Anion gap 5 11/04/2020 07:50 AM    Glucose 102 (H) 11/04/2020 07:50 AM    BUN 17 11/04/2020 07:50 AM    Creatinine 1.12 11/04/2020 07:50 AM    BUN/Creatinine ratio 15 11/04/2020 07:50 AM    GFR est AA >60 11/04/2020 07:50 AM    GFR est non-AA >60 11/04/2020 07:50 AM    Calcium 10.3 (H) 11/04/2020 07:50 AM    Bilirubin, total 0.9 09/29/2020 12:00 AM Alk. phosphatase 74 09/29/2020 12:00 AM    Protein, total 7.0 09/29/2020 12:00 AM    Albumin 4.7 09/29/2020 12:00 AM    Globulin 2.8 11/10/2013 10:35 PM    A-G Ratio 2.0 09/29/2020 12:00 AM    ALT (SGPT) 24 09/29/2020 12:00 AM           ASSESSMENT      1. Paroxysmal atrial fibrillation   A. Symptomatic  2. Bradycardia  3. Dyslipidemia       PLAN     Plan for AF Ablation with anesthesia; continue eliquis throughout; cMRI prior. ICD-10-CM ICD-9-CM    1. PVC (premature ventricular contraction)  I49.3 427.69    2. Mixed hyperlipidemia  E78.2 272.2    3. Paroxysmal atrial fibrillation (HCC)  I48.0 427.31    4. Bradycardia  R00.1 427.89      No orders of the defined types were placed in this encounter. FOLLOW-UP       Thank you, Leverette Schilder, MD and Dr. Ajay Orosco for allowing me to participate in the care of this extraordinarily pleasant male. Please do not hesitate to contact me for further questions/concerns.          Alicia Reyna MD  Cardiac Electrophysiology / Cardiology    [address]  [address] [facility], [facility], [address] [place] [address]  [phone] / [phone] Fax   [phone] / [phone] Fax

## 2020-11-19 NOTE — TELEPHONE ENCOUNTER
Papers received. Called pt to let him know, ask how he wants to get them back. Should be ready beginning of next week.   DANNY

## 2020-11-20 ENCOUNTER — TELEPHONE (OUTPATIENT)
Dept: CARDIOLOGY CLINIC | Age: 65
End: 2020-11-20

## 2020-11-20 NOTE — TELEPHONE ENCOUNTER
New order for CMRI with extended expiration date placed. Dulcie Decree with [facility] scheduling notified.

## 2020-11-20 NOTE — TELEPHONE ENCOUNTER
Stevan Fulton w/ [address] stated that she is unable to schedule the MRI due to the expiration date. She stated that there is no availability prior to the expiration date of 12/18/20. Please advise.     Phone #: [phone]  Thanks

## 2020-11-23 ENCOUNTER — TELEPHONE (OUTPATIENT)
Dept: CARDIOLOGY CLINIC | Age: 65
End: 2020-11-23

## 2020-11-23 NOTE — TELEPHONE ENCOUNTER
Patient stated that he has a few questions about length of time in between having his MRI and the actual procedure. Please advise.     Phone #: [phone]  Thanks

## 2020-11-23 NOTE — TELEPHONE ENCOUNTER
Returned patient call, ID verified using two patient identifiers. Patient had originally wanted ablation in January, but he has met his deductible and would now if at all possible like to have his CMRI and Afib ablation before the end of the year. Advised patient that I would try to see if we can get his CMRI done sooner and that as of right now Dr. Lefty Perla has availability@ [facility] before January. Patient very appreciative and verbalizes understanding of all information.

## 2020-11-24 NOTE — TELEPHONE ENCOUNTER
Called Leonard Rincon with [facility] scheduling, patient ID verified using two patient identifiers. Informed her that Dr. Holly Vogel is working on trying to get patient an earlier date for his CMRI, so that he can have his AFib ablation done before the end of the year. Enrike Camarenad understanding of all information.

## 2020-11-24 NOTE — TELEPHONE ENCOUNTER
Gabby Cortez with scheduling states the pt will get a procedure on 11/22 and the MRI can be completed before the procedure. Gabby Cortez would like to talk to [facility].  Please advise      Phone:[phone]

## 2020-11-25 NOTE — TELEPHONE ENCOUNTER
CMRI rescheduled to 11/30/20. Called patient, ID verified using two patient identifiers. Scheduled for AFib ablation with Dr. Jun Kauffman @ [facility] on Tuesday 12/22/20 with an arrival time of 6 am. Pre procedure instructions to be mailed to patient's home address.

## 2020-11-30 ENCOUNTER — HOSPITAL ENCOUNTER (OUTPATIENT)
Dept: MRI IMAGING | Age: 65
Discharge: HOME | End: 2020-11-30
Attending: INTERNAL MEDICINE
Payer: COMMERCIAL

## 2020-11-30 VITALS — WEIGHT: 180 LBS | BODY MASS INDEX: 24.76 KG/M2

## 2020-11-30 DIAGNOSIS — I48.0 PAROXYSMAL ATRIAL FIBRILLATION (HCC): ICD-10-CM

## 2020-11-30 PROCEDURE — 75561 CARDIAC MRI FOR MORPH W/DYE: CPT

## 2020-11-30 PROCEDURE — A9576 INJ PROHANCE MULTIPACK: HCPCS | Performed by: INTERNAL MEDICINE

## 2020-11-30 PROCEDURE — 77030021566

## 2020-11-30 PROCEDURE — 74011636320 HC RX REV CODE- 636/320: Performed by: INTERNAL MEDICINE

## 2020-11-30 RX ADMIN — GADOTERIDOL 24 ML: 279.3 INJECTION, SOLUTION INTRAVENOUS at 09:23

## 2020-12-01 DIAGNOSIS — Z01.812 PRE-PROCEDURE LAB EXAM: ICD-10-CM

## 2020-12-01 DIAGNOSIS — I48.0 PAROXYSMAL ATRIAL FIBRILLATION (HCC): ICD-10-CM

## 2020-12-01 DIAGNOSIS — I48.0 PAROXYSMAL ATRIAL FIBRILLATION (HCC): Primary | ICD-10-CM

## 2020-12-01 RX ORDER — SODIUM CHLORIDE 0.9 % (FLUSH) 0.9 %
5-40 SYRINGE (ML) INJECTION EVERY 8 HOURS
Status: CANCELLED | OUTPATIENT
Start: 2020-12-01

## 2020-12-01 RX ORDER — SODIUM CHLORIDE 0.9 % (FLUSH) 0.9 %
5-40 SYRINGE (ML) INJECTION AS NEEDED
Status: CANCELLED | OUTPATIENT
Start: 2020-12-01

## 2020-12-08 ENCOUNTER — TELEPHONE (OUTPATIENT)
Dept: CARDIOLOGY CLINIC | Age: 65
End: 2020-12-08

## 2020-12-08 NOTE — TELEPHONE ENCOUNTER
Patient is requesting to speak with Mervat Pruett regarding his [facility] paperwork. Please advise.     Phone: [phone]

## 2020-12-08 NOTE — TELEPHONE ENCOUNTER
Fax received from [address] stating that there was some missing information. Called pt, LVM have paperwork for Dr. Tad Thakur to update.

## 2020-12-10 ENCOUNTER — TELEPHONE (OUTPATIENT)
Dept: CARDIOLOGY CLINIC | Age: 65
End: 2020-12-10

## 2020-12-10 NOTE — TELEPHONE ENCOUNTER
Pt requesting to speak with [facility] in regards to another question he has about his FMLA.  Please advise      FLAVIA:[phone]

## 2020-12-11 NOTE — TELEPHONE ENCOUNTER
Spoke to pt about [facility] paperwork r/t afib, ablation. He was very grateful with everyone working hard to get him in for the ablation by the end of the year as he had already met his deductible     Pt had multiple questions about insurance and paperwork that I asked him to discuss with his HR or insurance company. Let him know that we have everything together and we are just waiting for Dr. Brianne Samuel to be back in the office for it to be finalized. He asked that we call when faxed and he would like a copy himself as well.

## 2020-12-11 NOTE — TELEPHONE ENCOUNTER
Pt following up on the message sent on 12/10. Pt has questions he needs answers before his upcoming procedure.  Please advise      [facility]:[phone]

## 2020-12-15 LAB
BUN SERPL-MCNC: 14 MG/DL (ref 8–27)
BUN/CREAT SERPL: 13 (ref 10–24)
CALCIUM SERPL-MCNC: 10.7 MG/DL (ref 8.6–10.2)
CHLORIDE SERPL-SCNC: 104 MMOL/L (ref 96–106)
CO2 SERPL-SCNC: 26 MMOL/L (ref 20–29)
CREAT SERPL-MCNC: 1.07 MG/DL (ref 0.76–1.27)
ERYTHROCYTE [DISTWIDTH] IN BLOOD BY AUTOMATED COUNT: 11.5 % (ref 11.6–15.4)
GLUCOSE SERPL-MCNC: 97 MG/DL (ref 65–99)
HCT VFR BLD AUTO: 46.9 % (ref 37.5–51)
HGB BLD-MCNC: 15.4 G/DL (ref 13–17.7)
INR PPP: 1 (ref 0.9–1.2)
MCH RBC QN AUTO: 31 PG (ref 26.6–33)
MCHC RBC AUTO-ENTMCNC: 32.8 G/DL (ref 31.5–35.7)
MCV RBC AUTO: 94 FL (ref 79–97)
PLATELET # BLD AUTO: 207 X10E3/UL (ref 150–450)
POTASSIUM SERPL-SCNC: 4.7 MMOL/L (ref 3.5–5.2)
PROTHROMBIN TIME: 10.9 SEC (ref 9.1–12)
RBC # BLD AUTO: 4.97 X10E6/UL (ref 4.14–5.8)
SODIUM SERPL-SCNC: 142 MMOL/L (ref 134–144)
WBC # BLD AUTO: 4.6 X10E3/UL (ref 3.4–10.8)

## 2020-12-16 ENCOUNTER — TELEPHONE (OUTPATIENT)
Dept: CARDIOLOGY CLINIC | Age: 65
End: 2020-12-16

## 2020-12-16 NOTE — TELEPHONE ENCOUNTER
Patient states due to his insurance change he now has to get prescriptions from mail order. He asks that you send prescription for Eliquis to [facility].      Phone: [phone]

## 2020-12-16 NOTE — TELEPHONE ENCOUNTER
Refill per VO of Dr. Ajay Orosco:  Last appt: 11/18/2020  Future Appointments   Date Time Provider Mary Ju   12/22/2020  8:00 AM [facility] CATH LAB [facility]   3/22/2021  3:40 PM MD SATNAM Zaragoza AMB       Requested Prescriptions     Pending Prescriptions Disp Refills    apixaban (Eliquis) 5 mg tablet 180 Tab 3     Sig: Take 1 Tab by mouth two (2) times a day.

## 2020-12-17 ENCOUNTER — TELEPHONE (OUTPATIENT)
Dept: CARDIOLOGY CLINIC | Age: 65
End: 2020-12-17

## 2020-12-17 NOTE — TELEPHONE ENCOUNTER
Returned call, patient ID verified using two patient identifiers. Informed patient that I had contacted our financial clearing center and his AFib ablation has been approved. Patient verbalizes understanding of all information. Final auth today, approved 12/22 - 6/22/21. Auth # [id number] per Radha Marie in [facility]. Patient very appreciative of information.

## 2020-12-17 NOTE — TELEPHONE ENCOUNTER
Patient stated that he received a letter in the mail, dated 12/8/20, that his insurance had not yet approved his upcoming procedure and he is calling to see if it had since. Please advise.     Phone #: [phone]  Thanks

## 2020-12-18 ENCOUNTER — HOSPITAL ENCOUNTER (OUTPATIENT)
Dept: LAB | Age: 65
Discharge: HOME | End: 2020-12-18
Payer: COMMERCIAL

## 2020-12-18 ENCOUNTER — TRANSCRIBE ORDER (OUTPATIENT)
Dept: REGISTRATION | Age: 65
End: 2020-12-18

## 2020-12-18 DIAGNOSIS — Z01.812 PRE-PROCEDURAL LABORATORY EXAMINATIONS: Primary | ICD-10-CM

## 2020-12-18 DIAGNOSIS — Z01.812 PRE-PROCEDURAL LABORATORY EXAMINATIONS: ICD-10-CM

## 2020-12-18 DIAGNOSIS — I48.0 PAROXYSMAL ATRIAL FIBRILLATION (HCC): ICD-10-CM

## 2020-12-18 PROCEDURE — 87635 SARS-COV-2 COVID-19 AMP PRB: CPT

## 2020-12-19 LAB — SARS-COV-2, COV2NT: NOT DETECTED

## 2020-12-22 ENCOUNTER — ANESTHESIA (OUTPATIENT)
Dept: CARDIAC CATH/INVASIVE PROCEDURES | Age: 65
End: 2020-12-22
Payer: COMMERCIAL

## 2020-12-22 ENCOUNTER — APPOINTMENT (OUTPATIENT)
Dept: CARDIAC CATH/INVASIVE PROCEDURES | Age: 65
End: 2020-12-22
Attending: INTERNAL MEDICINE
Payer: COMMERCIAL

## 2020-12-22 ENCOUNTER — ANESTHESIA EVENT (OUTPATIENT)
Dept: CARDIAC CATH/INVASIVE PROCEDURES | Age: 65
End: 2020-12-22
Payer: COMMERCIAL

## 2020-12-22 ENCOUNTER — HOSPITAL ENCOUNTER (OUTPATIENT)
Age: 65
Setting detail: OUTPATIENT SURGERY
Discharge: HOME | End: 2020-12-22
Attending: INTERNAL MEDICINE | Admitting: INTERNAL MEDICINE
Payer: COMMERCIAL

## 2020-12-22 VITALS
RESPIRATION RATE: 15 BRPM | HEART RATE: 55 BPM | HEIGHT: 71 IN | WEIGHT: 185 LBS | DIASTOLIC BLOOD PRESSURE: 81 MMHG | SYSTOLIC BLOOD PRESSURE: 119 MMHG | TEMPERATURE: 97.6 F | OXYGEN SATURATION: 98 % | BODY MASS INDEX: 25.9 KG/M2

## 2020-12-22 DIAGNOSIS — I48.0 PAROXYSMAL ATRIAL FIBRILLATION (HCC): ICD-10-CM

## 2020-12-22 LAB
ABO + RH BLD: NORMAL
ACT BLD: 191 SECS (ref 79–138)
ACT BLD: 224 SECS (ref 79–138)
ACT BLD: 241 SECS (ref 79–138)
BLOOD GROUP ANTIBODIES SERPL: NORMAL
SPECIMEN EXP DATE BLD: NORMAL

## 2020-12-22 PROCEDURE — 93656 COMPRE EP EVAL ABLTJ ATR FIB: CPT | Performed by: INTERNAL MEDICINE

## 2020-12-22 PROCEDURE — 86900 BLOOD TYPING SEROLOGIC ABO: CPT

## 2020-12-22 PROCEDURE — 93613 INTRACARDIAC EPHYS 3D MAPG: CPT | Performed by: INTERNAL MEDICINE

## 2020-12-22 PROCEDURE — C1732 CATH, EP, DIAG/ABL, 3D/VECT: HCPCS | Performed by: INTERNAL MEDICINE

## 2020-12-22 PROCEDURE — C1894 INTRO/SHEATH, NON-LASER: HCPCS | Performed by: INTERNAL MEDICINE

## 2020-12-22 PROCEDURE — 74011250636 HC RX REV CODE- 250/636: Performed by: NURSE ANESTHETIST, CERTIFIED REGISTERED

## 2020-12-22 PROCEDURE — C1759 CATH, INTRA ECHOCARDIOGRAPHY: HCPCS | Performed by: INTERNAL MEDICINE

## 2020-12-22 PROCEDURE — 93312 ECHO TRANSESOPHAGEAL: CPT

## 2020-12-22 PROCEDURE — 76060000035 HC ANESTHESIA 2 TO 2.5 HR: Performed by: INTERNAL MEDICINE

## 2020-12-22 PROCEDURE — 77030020506 HC NDL TRNSPTL NRG BAYL -F: Performed by: INTERNAL MEDICINE

## 2020-12-22 PROCEDURE — 77030013797 HC KT TRNSDUC PRSSR EDWD -A: Performed by: INTERNAL MEDICINE

## 2020-12-22 PROCEDURE — 77030027107 HC PTCH EXT REF CARTO3 J&J -F: Performed by: INTERNAL MEDICINE

## 2020-12-22 PROCEDURE — 93662 INTRACARDIAC ECG (ICE): CPT | Performed by: INTERNAL MEDICINE

## 2020-12-22 PROCEDURE — 74011000250 HC RX REV CODE- 250: Performed by: NURSE ANESTHETIST, CERTIFIED REGISTERED

## 2020-12-22 PROCEDURE — C1769 GUIDE WIRE: HCPCS | Performed by: INTERNAL MEDICINE

## 2020-12-22 PROCEDURE — 77030041242 HC CBL CONN CARTO 3 J&J -D: Performed by: INTERNAL MEDICINE

## 2020-12-22 PROCEDURE — 77030039046 HC PAD DEFIB RADIOTRNSPNT CNMD -B: Performed by: INTERNAL MEDICINE

## 2020-12-22 PROCEDURE — 77030035291 HC TBNG PMP SMARTABLATE J&J -B: Performed by: INTERNAL MEDICINE

## 2020-12-22 PROCEDURE — 77030029359 HC PRB ESOPH TEMP CATH ANTM -F: Performed by: INTERNAL MEDICINE

## 2020-12-22 PROCEDURE — C1893 INTRO/SHEATH, FIXED,NON-PEEL: HCPCS | Performed by: INTERNAL MEDICINE

## 2020-12-22 PROCEDURE — 85347 COAGULATION TIME ACTIVATED: CPT

## 2020-12-22 PROCEDURE — 36415 COLL VENOUS BLD VENIPUNCTURE: CPT

## 2020-12-22 PROCEDURE — C1760 CLOSURE DEV, VASC: HCPCS | Performed by: INTERNAL MEDICINE

## 2020-12-22 PROCEDURE — 77030008684 HC TU ET CUF COVD -B: Performed by: ANESTHESIOLOGY

## 2020-12-22 RX ORDER — SUCCINYLCHOLINE CHLORIDE 20 MG/ML
INJECTION INTRAMUSCULAR; INTRAVENOUS AS NEEDED
Status: DISCONTINUED | OUTPATIENT
Start: 2020-12-22 | End: 2020-12-22 | Stop reason: HOSPADM

## 2020-12-22 RX ORDER — HYDROCODONE BITARTRATE AND ACETAMINOPHEN 5; 325 MG/1; MG/1
1 TABLET ORAL
Status: DISCONTINUED | OUTPATIENT
Start: 2020-12-22 | End: 2020-12-22 | Stop reason: HOSPADM

## 2020-12-22 RX ORDER — PROTAMINE SULFATE 10 MG/ML
INJECTION, SOLUTION INTRAVENOUS AS NEEDED
Status: DISCONTINUED | OUTPATIENT
Start: 2020-12-22 | End: 2020-12-22 | Stop reason: HOSPADM

## 2020-12-22 RX ORDER — SODIUM CHLORIDE 9 MG/ML
INJECTION, SOLUTION INTRAVENOUS
Status: DISCONTINUED | OUTPATIENT
Start: 2020-12-22 | End: 2020-12-22 | Stop reason: HOSPADM

## 2020-12-22 RX ORDER — PROPOFOL 10 MG/ML
INJECTION, EMULSION INTRAVENOUS AS NEEDED
Status: DISCONTINUED | OUTPATIENT
Start: 2020-12-22 | End: 2020-12-22 | Stop reason: HOSPADM

## 2020-12-22 RX ORDER — ONDANSETRON 2 MG/ML
4 INJECTION INTRAMUSCULAR; INTRAVENOUS
Status: DISCONTINUED | OUTPATIENT
Start: 2020-12-22 | End: 2020-12-22 | Stop reason: HOSPADM

## 2020-12-22 RX ORDER — FENTANYL CITRATE 50 UG/ML
INJECTION, SOLUTION INTRAMUSCULAR; INTRAVENOUS AS NEEDED
Status: DISCONTINUED | OUTPATIENT
Start: 2020-12-22 | End: 2020-12-22 | Stop reason: HOSPADM

## 2020-12-22 RX ORDER — HEPARIN SODIUM 1000 [USP'U]/ML
INJECTION, SOLUTION INTRAVENOUS; SUBCUTANEOUS AS NEEDED
Status: DISCONTINUED | OUTPATIENT
Start: 2020-12-22 | End: 2020-12-22 | Stop reason: HOSPADM

## 2020-12-22 RX ORDER — SODIUM CHLORIDE 0.9 % (FLUSH) 0.9 %
5-40 SYRINGE (ML) INJECTION AS NEEDED
Status: DISCONTINUED | OUTPATIENT
Start: 2020-12-22 | End: 2020-12-22 | Stop reason: HOSPADM

## 2020-12-22 RX ORDER — ROCURONIUM BROMIDE 10 MG/ML
INJECTION, SOLUTION INTRAVENOUS AS NEEDED
Status: DISCONTINUED | OUTPATIENT
Start: 2020-12-22 | End: 2020-12-22 | Stop reason: HOSPADM

## 2020-12-22 RX ORDER — DEXAMETHASONE SODIUM PHOSPHATE 4 MG/ML
INJECTION, SOLUTION INTRA-ARTICULAR; INTRALESIONAL; INTRAMUSCULAR; INTRAVENOUS; SOFT TISSUE AS NEEDED
Status: DISCONTINUED | OUTPATIENT
Start: 2020-12-22 | End: 2020-12-22 | Stop reason: HOSPADM

## 2020-12-22 RX ORDER — ACETAMINOPHEN 325 MG/1
650 TABLET ORAL
Status: DISCONTINUED | OUTPATIENT
Start: 2020-12-22 | End: 2020-12-22 | Stop reason: HOSPADM

## 2020-12-22 RX ORDER — SODIUM CHLORIDE, SODIUM LACTATE, POTASSIUM CHLORIDE, CALCIUM CHLORIDE 600; 310; 30; 20 MG/100ML; MG/100ML; MG/100ML; MG/100ML
INJECTION, SOLUTION INTRAVENOUS
Status: DISCONTINUED | OUTPATIENT
Start: 2020-12-22 | End: 2020-12-22

## 2020-12-22 RX ORDER — SODIUM CHLORIDE 0.9 % (FLUSH) 0.9 %
5-40 SYRINGE (ML) INJECTION EVERY 8 HOURS
Status: DISCONTINUED | OUTPATIENT
Start: 2020-12-22 | End: 2020-12-22 | Stop reason: HOSPADM

## 2020-12-22 RX ORDER — PHENYLEPHRINE HCL IN 0.9% NACL 0.4MG/10ML
SYRINGE (ML) INTRAVENOUS AS NEEDED
Status: DISCONTINUED | OUTPATIENT
Start: 2020-12-22 | End: 2020-12-22 | Stop reason: HOSPADM

## 2020-12-22 RX ADMIN — PROPOFOL 200 MG: 10 INJECTION, EMULSION INTRAVENOUS at 08:48

## 2020-12-22 RX ADMIN — HEPARIN SODIUM 8000 UNITS: 1000 INJECTION, SOLUTION INTRAVENOUS; SUBCUTANEOUS at 09:24

## 2020-12-22 RX ADMIN — SODIUM CHLORIDE: 900 INJECTION, SOLUTION INTRAVENOUS at 08:30

## 2020-12-22 RX ADMIN — DEXAMETHASONE SODIUM PHOSPHATE 4 MG: 4 INJECTION, SOLUTION INTRAMUSCULAR; INTRAVENOUS at 08:53

## 2020-12-22 RX ADMIN — PROTAMINE SULFATE 10 MG: 10 INJECTION, SOLUTION INTRAVENOUS at 10:20

## 2020-12-22 RX ADMIN — HEPARIN SODIUM 4000 UNITS: 1000 INJECTION, SOLUTION INTRAVENOUS; SUBCUTANEOUS at 09:59

## 2020-12-22 RX ADMIN — ROCURONIUM BROMIDE 10 MG: 10 SOLUTION INTRAVENOUS at 08:48

## 2020-12-22 RX ADMIN — FENTANYL CITRATE 50 MCG: 50 INJECTION, SOLUTION INTRAMUSCULAR; INTRAVENOUS at 09:18

## 2020-12-22 RX ADMIN — SUCCINYLCHOLINE CHLORIDE 120 MG: 20 INJECTION, SOLUTION INTRAMUSCULAR; INTRAVENOUS at 08:48

## 2020-12-22 RX ADMIN — PROTAMINE SULFATE 10 MG: 10 INJECTION, SOLUTION INTRAVENOUS at 10:14

## 2020-12-22 RX ADMIN — PHENYLEPHRINE HYDROCHLORIDE 25 MCG/MIN: 10 INJECTION INTRAVENOUS at 09:39

## 2020-12-22 RX ADMIN — Medication 80 MCG: at 09:17

## 2020-12-22 RX ADMIN — Medication 80 MCG: at 09:06

## 2020-12-22 RX ADMIN — Medication 80 MCG: at 09:10

## 2020-12-22 RX ADMIN — PROTAMINE SULFATE 10 MG: 10 INJECTION, SOLUTION INTRAVENOUS at 10:16

## 2020-12-22 RX ADMIN — Medication 80 MCG: at 09:26

## 2020-12-22 RX ADMIN — Medication 80 MCG: at 09:02

## 2020-12-22 RX ADMIN — FENTANYL CITRATE 50 MCG: 50 INJECTION, SOLUTION INTRAMUSCULAR; INTRAVENOUS at 08:48

## 2020-12-22 RX ADMIN — HEPARIN SODIUM 5000 UNITS: 1000 INJECTION, SOLUTION INTRAVENOUS; SUBCUTANEOUS at 09:43

## 2020-12-22 RX ADMIN — Medication 80 MCG: at 09:34

## 2020-12-22 RX ADMIN — PROTAMINE SULFATE 10 MG: 10 INJECTION, SOLUTION INTRAVENOUS at 10:18

## 2020-12-22 RX ADMIN — SODIUM CHLORIDE: 900 INJECTION, SOLUTION INTRAVENOUS at 09:14

## 2020-12-22 NOTE — PROGRESS NOTES
TRANSFER - IN REPORT:    Verbal report received from José reagan RN on Carlee Freedman  being received from procedure for routine progression of care. Report consisted of patients Situation, Background, Assessment and Recommendations(SBAR). Information from the following report(s) Procedure Summary was reviewed with the receiving clinician. Opportunity for questions and clarification was provided. Assessment completed upon patients arrival to [address] [place] [address]. Cardiac Cath Lab Recovery Arrival Note:    Carlee Freedman arrived to [facility] recovery area. Patient procedure= electrophysiology study with atrial fibrillation ablation. Patient on cardiac monitor, non-invasive blood pressure, SPO2 monitor. On room air. IV  of saline locked. Patient is A&Ox 4. Patient reports no complaints. PROCEDURE SITE CHECK:    Procedure site:without any bleeding or hematoma, no pain/discomfort reported at procedure site. No change in patient status. Continue to monitor patient and status.

## 2020-12-22 NOTE — Clinical Note
Bilateral groin clipped prepped with ChloraPrep and draped. Wet prep solution applied at: 838. Wet prep solution dried at: 841. Wet prep elapsed drying time: 3 mins.

## 2020-12-22 NOTE — H&P
HISTORY OF PRESENTING ILLNESS      Nisha Jones is a 60 y.o. male with dyslipidemia, bradycardia who has been referred for further management of his symptomatic PAF. He was first diagnosed in 2012 during an ER visit for palpitations. He was cardioverted then, as well as 9/2020 and most recently 11/4/2020. He is on Eliquis currently although his Chads Vasc is 0. Medical therapy has been prohibited due to resting bradycardia.          PAST MEDICAL HISTORY           Past Medical History:   Diagnosis Date    Atrial fibrillation [facility])       ER visit on 2/16/12 - converted to NSR with [facility];    Cardioverted in ED again 9/28/20. 2nd [facility] on 11/4/20    Bradycardia       Started seeing Dr. Helton July and Dr. Theodore Hernandez for this years ago    GERD (gastroesophageal reflux disease) 1998     [place]    Gout      Hypoglycemia      Mixed hyperlipidemia      Nausea & vomiting      PVC (premature ventricular contraction)               PAST SURGICAL HISTORY            Past Surgical History:   Procedure Laterality Date    ECHO STRESS   2007     normal resting LV wall motion and no evidence of ischemia. Gated EF (%): 60-65%    HX GI   1998     Nissen fundiplication    HX GI         polypectomy from colon    HX HERNIA REPAIR         Left IHR, right IHR, hiatal hernia    HX HERNIA REPAIR        HX KNEE ARTHROSCOPY   1972     right    HX OTHER SURGICAL   1989, 2005     excision 7 lipomas, excision 3 lipomas,mole excision back    HX OTHER SURGICAL         Echo 2/17/12 - Normal    HX UROLOGICAL         epididydectomy left    LOOP MONITOR   2007     no significant arrhythmias infrequent atrial extrasystoles and ventricular extrasystoles.      TOTAL KNEE ARTHROPLASTY   2008     right             ALLERGIES            Allergies   Allergen Reactions    Vioxx [Rofecoxib] Other (comments)       Mouth ulcers    Zofran Odt [Ondansetron] Nausea Only            FAMILY HISTORY            Family History   Problem Relation Age of Onset    Arrhythmia Father      negative for cardiac disease         SOCIAL HISTORY      Social History               Socioeconomic History    Marital status:        Spouse name: Not on file    Number of children: Not on file    Years of education: Not on file    Highest education level: Not on file   Tobacco Use    Smoking status: Former Smoker       Packs/day: 1.00       Years: 12.00       Pack years: 12.00       Last attempt to quit: 3/31/1986       Years since quittin.6    Smokeless tobacco: Never Used   Substance and Sexual Activity    Alcohol use: Yes       Alcohol/week: 10.0 standard drinks       Types: 10 Cans of beer per week    Drug use: No    Sexual activity: Yes       Partners: Female               MEDICATIONS           Current Outpatient Medications   Medication Sig    acetaminophen (TYLENOL) 500 mg tablet Take 1,000 mg by mouth every eight (8) hours as needed for Pain.  apixaban (Eliquis) 5 mg tablet Take 1 Tab by mouth two (2) times a day.  ascorbic acid, vitamin C, (Vitamin C) 500 mg tablet Take 500 mg by mouth two (2) times a day.  simvastatin (ZOCOR) 40 mg tablet Take 1 Tab by mouth nightly.  multivitamin, stress formula (STRESS TAB) tablet Take 1 Tab by mouth daily.  omega-3 fatty acids-vitamin e (FISH OIL) 1,000 mg Cap Take 1 Cap by mouth two (2) times a day.      No current facility-administered medications for this visit.          I have reviewed the nurses notes, vitals, problem list, allergy list, medical history, family, social history and medications.         REVIEW OF SYMPTOMS      General: Pt denies excessive weight gain or loss. Pt is able to conduct ADL's  HEENT: Denies blurred vision, headaches, hearing loss, epistaxis and difficulty swallowing. Respiratory: Denies cough, congestion, shortness of breath, ROSENBERG, wheezing or stridor.   Cardiovascular: Denies precordial pain, palpitations, edema or PND  Gastrointestinal: Denies poor appetite, indigestion, abdominal pain or blood in stool  Genitourinary: Denies hematuria, dysuria, increased urinary frequency  Musculoskeletal: Denies joint pain or swelling from muscles or joints  Neurologic: Denies tremor, paresthesias, headache, or sensory motor disturbance  Psychiatric: Denies confusion, insomnia, depression  Integumentray: Denies rash, itching or ulcers. Hematologic: Denies easy bruising, bleeding         PHYSICAL EXAMINATION      Vitals: see vitals section  General: Well developed, in no acute distress. HEENT: No jaundice, oral mucosa moist, no oral ulcers  Neck: Supple, no stiffness, no lymphadenopathy, supple  Heart:  Normal S1/S2 negative S3 or S4. Regular, no murmur, gallop or rub, no jugular venous distention  Respiratory: Clear bilaterally x 4, no wheezing or rales  Abdomen:   Soft, non-tender, bowel sounds are active.   Extremities:  No edema, normal cap refill, no cyanosis. Musculoskeletal: No clubbing, no deformities  Neuro: A&Ox3, speech clear, gait stable, cooperative, no focal neurologic deficits  Skin: Skin color is normal. No rashes or lesions.  Non diaphoretic, moist.  Vascular: 2+ pulses symmetric in all extremities         DIAGNOSTIC DATA      EKG:          LABORATORY DATA            Lab Results   Component Value Date/Time     WBC 5.7 11/04/2020 07:50 AM     Hemoglobin (POC) 15.3 02/16/2012 03:09 PM     HGB 16.3 11/04/2020 07:50 AM     Hematocrit (POC) 45 02/16/2012 03:09 PM     HCT 48.8 11/04/2020 07:50 AM     PLATELET 289 40/79/5351 07:50 AM     MCV 93.8 11/04/2020 07:50 AM            Lab Results   Component Value Date/Time     Sodium 140 11/04/2020 07:50 AM     Potassium 4.4 11/04/2020 07:50 AM     Chloride 107 11/04/2020 07:50 AM     CO2 28 11/04/2020 07:50 AM     Anion gap 5 11/04/2020 07:50 AM     Glucose 102 (H) 11/04/2020 07:50 AM     BUN 17 11/04/2020 07:50 AM     Creatinine 1.12 11/04/2020 07:50 AM     BUN/Creatinine ratio 15 11/04/2020 07:50 AM     GFR est AA >60 11/04/2020 07:50 AM     GFR est non-AA >60 11/04/2020 07:50 AM     Calcium 10.3 (H) 11/04/2020 07:50 AM     Bilirubin, total 0.9 09/29/2020 12:00 AM     Alk. phosphatase 74 09/29/2020 12:00 AM     Protein, total 7.0 09/29/2020 12:00 AM     Albumin 4.7 09/29/2020 12:00 AM     Globulin 2.8 11/10/2013 10:35 PM     A-G Ratio 2.0 09/29/2020 12:00 AM     ALT (SGPT) 24 09/29/2020 12:00 AM             ASSESSMENT      1. Paroxysmal atrial fibrillation             A. Symptomatic  2. Bradycardia  3.  Dyslipidemia         PLAN      Plan for AF Ablation with anesthesia; continue eliquis throughout.              Delaney Castañeda MD  Cardiac Electrophysiology / Cardiology     [address], [address] [place] [address], [address]  [address]  [phone] / [phone] Fax                            [phone] / [phone] Fax

## 2020-12-22 NOTE — PROGRESS NOTES
Cardiac Cath Lab Recovery Arrival Note:      Maggie Richmond arrived to Cardiac Cath Lab, Recovery Area. Staff introduced to patient. Patient identifiers verified with NAME and DATE OF BIRTH. Procedure verified with patient. Consent forms reviewed and signed by patient or authorized representative and verified. Allergies verified. Patient and family oriented to department. Patient and family informed of procedure and plan of care. Questions answered with review. Patient prepped for procedure, per orders from physician, prior to arrival.    Patient on cardiac monitor, non-invasive blood pressure, SPO2 monitor. On room air. Patient is A&Ox 4. Patient reports no complaints. Patient in stretcher, in low position, with side rails up, call bell within reach, patient instructed to call if assistance as needed. Patient prep in: [address], [place] [address]. Family in: Severiano Fontaine (wife) [phone].    Prep by: Scotty Trejo RN

## 2020-12-22 NOTE — ANESTHESIA PREPROCEDURE EVALUATION
Relevant Problems   No relevant active problems       Anesthetic History   No history of anesthetic complications            Review of Systems / Medical History  Patient summary reviewed, nursing notes reviewed and pertinent labs reviewed    Pulmonary  Within defined limits                 Neuro/Psych   Within defined limits           Cardiovascular  Within defined limits          Dysrhythmias : atrial fibrillation           GI/Hepatic/Renal  Within defined limits              Endo/Other  Within defined limits           Other Findings              Physical Exam    Airway  Mallampati: II  TM Distance: > 6 cm  Neck ROM: normal range of motion   Mouth opening: Normal     Cardiovascular  Regular rate and rhythm,  S1 and S2 normal,  no murmur, click, rub, or gallop             Dental  No notable dental hx       Pulmonary  Breath sounds clear to auscultation               Abdominal  GI exam deferred       Other Findings            Anesthetic Plan    ASA: 3  Anesthesia type: general          Induction: Intravenous  Anesthetic plan and risks discussed with: Patient

## 2020-12-22 NOTE — Clinical Note
Transseptal Cath Performed check box under hemodynamic and ICE and Fluoro, utilizing a standard needle, Index 98cm via a guiding sheath. Needle inserted.

## 2020-12-22 NOTE — PROGRESS NOTES
6631 - Patient noted to have a vasovagal response after IV insertion. His heart rate became sinus bradycardia in the 30's and he felt nauseated. Patient's feet elevated and head is laid back and covers removed per patient request.  /67. Patient monitored and felt better after being flat x 5 minutes.

## 2020-12-22 NOTE — PROGRESS NOTES
Patient assisted to ambulate to bathroom to void; exhibits steady gait. Right groin site remains dry & intact. Patient changed into clothing from home. Peripheral IV's removed x 2 and dressings placed to sites. Discharge instructions reviewed with the patient; verbalized understanding and written copy of instructions given to the patient. Patient discharged via wheelchair in the care of his wife.

## 2020-12-22 NOTE — DISCHARGE INSTRUCTIONS
Electrophysiology Study (EPS)/Cardiac Ablation   Discharge Instructions      You have just had a Cardiac Ablation for: Atrial fibrillation. There were catheters temporarily placed in your heart through a puncture in the Veins and/or Arteries in your groin. WHAT TO EXPECT      If you have had a Cardiac Ablation please be aware that you may experience mild chest pain that will resolve within 24-48 hours. If the Chest Pain begins radiating down your shoulders or arms, becomes severe or breathing becomes difficult, call 911 or go to the closest emergency room.  Mild to moderate, non-painful, bruising or mild swelling at the puncture site is not un-common, and will resolve in 7 - 14 days, and may extend down your thigh as it heals.  If a closure device was used to seal your artery or vein, a separate pamphlet will be given to you with these discharge instructions. It is very important that you review the information in the pamphlet for different restrictions and precautions.  You have a small gauze dressing applied to the puncture site in your groin. You may remove this the following morning.  You MAY receive a 30 day heart monitor in the mail to wear after your procedure. This is to evaluate your heart rhythm post ablation. MEDICATIONS      Take only the medications prescribed to you at discharge. ACTIVITY      A responsible adult must take you home. Do not drive a car for 24 hours.  Rest quietly for the remainder of the day.  Do not lift anything greater than 10 pounds for 3 days.  Limit bending at the puncture site and use of stairs for at least 3 days.  You may remove the bandage and shower the morning after the procedure. Do not take a bath for 3 days. SYMPTOMS THAT NEED TO BE REPORTED IMMEDIATELY      Bleeding at the puncture site. If there is bleeding, lie down and hold firm direct pressure for at least 5 minutes.   If the bleeding does not stop, go to the closest emergency room, or call 911.  Temperature more than 100.5 F.   Redness or warmth at the puncture site.  Increasing pain, numbness, coolness or blue discoloration of the extremity where the puncture is located.  Pulsating mass at the puncture site.  A new lump at the puncture site, or increasing swelling at the site. Please be aware that a pea or marble sized lump is normal, anything larger or increasing in size should be reported.  Bruising at the puncture site that enlarges or becomes painful (some bruising at the site is common and will go away in 7-14 days).  Rapid heart rate or palpitations.  Dizziness, lightheadedness, fainting.  REMEMBER: If you feel something is an emergency or cannot be handled over the phone, call 911 or go to the closest emergency room. FOLLOW UP CARE     Raquel Singleton NP in 2 weeks  Dr. Marek Santillan in 3 months        Leslie Strange MD  Cardiac Electrophysiology / Cardiology    [place] [address].  [address] [place] [address] 57    [address] [facility]  [phone] / [phone] Fax   [phone] / [phone] Fax          [address] December 22, 2020       RE: Miri Whitten      To Whom It May Concern,    This is to certify that Stanley Hill may return to work in 3 days with limited activity (avoid lifting objects > 20 pounds). Please feel free to contact my office if you have any questions or concerns. Thank you for your assistance in this matter.       Sincerely,      Kelvin Sierra MD

## 2020-12-22 NOTE — ANESTHESIA POSTPROCEDURE EVALUATION
Procedure(s):  ABLATION A-FIB  W COMPLETE EP STUDY  Ep 3d Mapping  Intracardiac Echocardiogram.    general    <BSHSIANPOST>    INITIAL Post-op Vital signs:   Vitals Value Taken Time   /81 12/22/20 1230   Temp 36.4 °C (97.6 °F) 12/22/20 1115   Pulse 59 12/22/20 1256   Resp 17 12/22/20 1256   SpO2 97 % 12/22/20 1256   Vitals shown include unvalidated device data.

## 2020-12-28 ENCOUNTER — TELEPHONE (OUTPATIENT)
Dept: CARDIOLOGY CLINIC | Age: 65
End: 2020-12-28

## 2020-12-28 NOTE — TELEPHONE ENCOUNTER
Returned call, no answer. LM to have call returned to [phone]. Per GILDA Melendrez NP his restrictions for work are for 7 days. AFib ablation done 12/22/20.

## 2020-12-28 NOTE — TELEPHONE ENCOUNTER
Patient recently had an ablation done with Dr. Juan Carlos Shaw and was given a note that he could return to work in 3 days with restrictions such as not lifting anything over 20 pounds. Patient is unsure how long he is under restrictions for. Please advise.     Phone: [phone]

## 2020-12-28 NOTE — TELEPHONE ENCOUNTER
Returned call, patient ID verified using two patient identifiers. Informed patient that per GILDA Rich NP his restrictions for work are for 7 days post ablation. Patient verbalizes understanding and will contact the office with any further questions or concerns.

## 2021-01-11 NOTE — PROGRESS NOTES
HISTORY OF PRESENTING ILLNESS      Christian Leonard is a 60 y.o. male with dyslipidemia, bradycardia who has been referred for further management of his symptomatic PAF. He was first diagnosed in 2012 during an ER visit for palpitations. He was cardioverted then, as well as 9/2020 and most recently 11/4/2020. He is on Eliquis currently although his Chads Vasc is 0. Medical therapy has been prohibited due to resting bradycardia. He underwent AF ablation on 12/22/2020 and now presents for follow up. His EKG shows sinus rhythm with one PAC. He has had occasional palpitations. PAST MEDICAL HISTORY     Past Medical History:   Diagnosis Date    Atrial fibrillation [facility])     ER visit on 2/16/12 - converted to NSR with [facility];    Cardioverted in ED again 9/28/20. 2nd [facility] on 11/4/20    Bradycardia     Started seeing Dr. Johny Fonseca and Dr. Francesco Ivan for this years ago    GERD (gastroesophageal reflux disease) 1998    [place]    Gout     Hypoglycemia     Mixed hyperlipidemia     Nausea & vomiting     PVC (premature ventricular contraction)            PAST SURGICAL HISTORY     Past Surgical History:   Procedure Laterality Date    ECHO STRESS  2007    normal resting LV wall motion and no evidence of ischemia. Gated EF (%): 60-65%    HX GI  1998    Nissen fundiplication    HX GI      polypectomy from colon    HX HERNIA REPAIR      Left IHR, right IHR, hiatal hernia    HX HERNIA REPAIR      HX KNEE ARTHROSCOPY  1972    right    HX OTHER SURGICAL  1989, 2005    excision 7 lipomas, excision 3 lipomas,mole excision back    HX OTHER SURGICAL      Echo 2/17/12 - Normal    HX UROLOGICAL      epididydectomy left    INTRACARD ECHO, THER/DX INTERVENT N/A 12/22/2020    Intracardiac Echocardiogram performed by Nael Covarrubias MD at [place] [address], [facility] [address] CATH LAB    LOOP MONITOR  2007    no significant arrhythmias infrequent atrial extrasystoles and ventricular extrasystoles.      [place] EPHYS EVL TRNSPTL TX ATRIAL FIB ISOLAT PULM VEIN N/A 2020    ABLATION A-FIB  W COMPLETE EP STUDY performed by Kevin Rocha MD at [address], [facility] Dr CATH LAB    [place] INTRACARDIAC ELECTROPHYSIOLOGIC 3D MAPPING N/A 2020    Ep 3d Mapping performed by Kevin Rocha MD at [address], [facility]/s  CATH LAB    TN TOTAL KNEE ARTHROPLASTY  2008    right          ALLERGIES     Allergies   Allergen Reactions    Vioxx [Rofecoxib] Other (comments)     Mouth ulcers    Zofran Odt [Ondansetron] Nausea Only          FAMILY HISTORY     Family History   Problem Relation Age of Onset    Arrhythmia Father     negative for cardiac disease       SOCIAL HISTORY     Social History     Socioeconomic History    Marital status:      Spouse name: Not on file    Number of children: Not on file    Years of education: Not on file    Highest education level: Not on file   Tobacco Use    Smoking status: Former Smoker     Packs/day: 1.00     Years: 12.00     Pack years: 12.00     Quit date: 3/31/1986     Years since quittin.8    Smokeless tobacco: Never Used   Substance and Sexual Activity    Alcohol use: Yes     Alcohol/week: 10.0 standard drinks     Types: 10 Cans of beer per week    Drug use: No    Sexual activity: Yes     Partners: Female         MEDICATIONS     Current Outpatient Medications   Medication Sig    apixaban (Eliquis) 5 mg tablet Take 1 Tab by mouth two (2) times a day.  acetaminophen (TYLENOL) 500 mg tablet Take 1,000 mg by mouth every eight (8) hours as needed for Pain.  ascorbic acid, vitamin C, (Vitamin C) 500 mg tablet Take 500 mg by mouth two (2) times a day.  simvastatin (ZOCOR) 40 mg tablet Take 1 Tab by mouth nightly.  multivitamin, stress formula (STRESS TAB) tablet Take 1 Tab by mouth daily.  omega-3 fatty acids-vitamin e (FISH OIL) 1,000 mg Cap Take 1 Cap by mouth two (2) times a day. No current facility-administered medications for this visit.         I have reviewed the nurses notes, vitals, problem list, allergy list, medical history, family, social history and medications. REVIEW OF SYMPTOMS      General: Pt denies excessive weight gain or loss. Pt is able to conduct ADL's  HEENT: Denies blurred vision, headaches, hearing loss, epistaxis and difficulty swallowing. Respiratory: Denies cough, congestion, shortness of breath, ROSENBERG, wheezing or stridor. Cardiovascular: +palpitations, Denies precordial pain, edema or PND  Gastrointestinal: Denies poor appetite, indigestion, abdominal pain or blood in stool  Genitourinary: Denies hematuria, dysuria, increased urinary frequency  Musculoskeletal: Denies joint pain or swelling from muscles or joints  Neurologic: Denies tremor, paresthesias, headache, or sensory motor disturbance  Psychiatric: Denies confusion, insomnia, depression  Integumentray: Denies rash, itching or ulcers. Hematologic: Denies easy bruising, bleeding       PHYSICAL EXAMINATION      Vitals: see vitals section  General: Well developed, in no acute distress. HEENT: No jaundice, oral mucosa moist, no oral ulcers  Neck: Supple, no stiffness, no lymphadenopathy, supple  Heart:  Normal S1/S2 negative S3 or S4. Regular, no murmur, gallop or rub, no jugular venous distention  Respiratory: Clear bilaterally x 4, no wheezing or rales  Abdomen:   Soft, non-tender, bowel sounds are active. Extremities:  No edema, normal cap refill, no cyanosis. Musculoskeletal: No clubbing, no deformities  Neuro: A&Ox3, speech clear, gait stable, cooperative, no focal neurologic deficits  Skin: Skin color is normal. No rashes or lesions.  Non diaphoretic, moist.  Vascular: 2+ pulses symmetric in all extremities       DIAGNOSTIC DATA      EKG: sinus rhythm with PAC       LABORATORY DATA      Lab Results   Component Value Date/Time    WBC 4.6 12/14/2020 09:07 AM    Hemoglobin (POC) 15.3 02/16/2012 03:09 PM    HGB 15.4 12/14/2020 09:07 AM    Hematocrit (POC) 45 02/16/2012 03:09 PM    HCT 46.9 12/14/2020 09:07 AM    PLATELET 173 12/14/2020 09:07 AM    MCV 94 12/14/2020 09:07 AM      Lab Results   Component Value Date/Time    Sodium 142 12/14/2020 09:07 AM    Potassium 4.7 12/14/2020 09:07 AM    Chloride 104 12/14/2020 09:07 AM    CO2 26 12/14/2020 09:07 AM    Anion gap 5 11/04/2020 07:50 AM    Glucose 97 12/14/2020 09:07 AM    BUN 14 12/14/2020 09:07 AM    Creatinine 1.07 12/14/2020 09:07 AM    BUN/Creatinine ratio 13 12/14/2020 09:07 AM    GFR est AA 84 12/14/2020 09:07 AM    GFR est non-AA 73 12/14/2020 09:07 AM    Calcium 10.7 (H) 12/14/2020 09:07 AM    Bilirubin, total 0.9 09/29/2020 12:00 AM    Alk. phosphatase 74 09/29/2020 12:00 AM    Protein, total 7.0 09/29/2020 12:00 AM    Albumin 4.7 09/29/2020 12:00 AM    Globulin 2.8 11/10/2013 10:35 PM    A-G Ratio 2.0 09/29/2020 12:00 AM    ALT (SGPT) 24 09/29/2020 12:00 AM           ASSESSMENT      1. Paroxysmal atrial fibrillation             A. Symptomatic  2. Bradycardia  3. Dyslipidemia       PLAN     Continue to monitor for recurrence of AF. Encouraged increased hydration, decreased caffeine intake. ICD-10-CM ICD-9-CM    1. Paroxysmal atrial fibrillation (HCC)  I48.0 427.31 AMB POC EKG ROUTINE W/ 12 LEADS, INTER & REP   2. PVC (premature ventricular contraction)  I49.3 427.69    3. Bradycardia  R00.1 427.89    4. S/P ablation of atrial fibrillation  Z98.890 V45.89     Z86.79       Orders Placed This Encounter    AMB POC EKG ROUTINE W/ 12 LEADS, INTER & REP     Order Specific Question:   Reason for Exam:     Answer:   AFIB ablation follow up          FOLLOW-UP   3 months    Thank you, Sha Beach MD for allowing me to participate in the care of this extraordinarily pleasant male. Please do not hesitate to contact me for further questions/concerns.      ROULA Pryor 92.  [facility], [address] [place] [address], [address] [place], [place] [address] [place] [address] [id number]  [phone] / [phone] Fax   [phone] / [phone] Fax

## 2021-01-12 ENCOUNTER — OFFICE VISIT (OUTPATIENT)
Dept: CARDIOLOGY CLINIC | Age: 66
End: 2021-01-12
Payer: COMMERCIAL

## 2021-01-12 VITALS
HEART RATE: 64 BPM | DIASTOLIC BLOOD PRESSURE: 78 MMHG | WEIGHT: 187.4 LBS | SYSTOLIC BLOOD PRESSURE: 122 MMHG | HEIGHT: 71 IN | RESPIRATION RATE: 18 BRPM | OXYGEN SATURATION: 99 % | BODY MASS INDEX: 26.23 KG/M2

## 2021-01-12 DIAGNOSIS — I48.0 PAROXYSMAL ATRIAL FIBRILLATION (HCC): Primary | ICD-10-CM

## 2021-01-12 DIAGNOSIS — R00.1 BRADYCARDIA: ICD-10-CM

## 2021-01-12 DIAGNOSIS — Z98.890 S/P ABLATION OF ATRIAL FIBRILLATION: ICD-10-CM

## 2021-01-12 DIAGNOSIS — Z86.79 S/P ABLATION OF ATRIAL FIBRILLATION: ICD-10-CM

## 2021-01-12 DIAGNOSIS — I49.3 PVC (PREMATURE VENTRICULAR CONTRACTION): ICD-10-CM

## 2021-01-12 PROCEDURE — 99215 OFFICE O/P EST HI 40 MIN: CPT | Performed by: NURSE PRACTITIONER

## 2021-01-12 PROCEDURE — 93000 ELECTROCARDIOGRAM COMPLETE: CPT | Performed by: NURSE PRACTITIONER

## 2021-01-12 NOTE — LETTER
1/12/2021 9:18 AM 
 
Mr. Gregorio Ochoa [address] [place] [address] To Whom It May Concern: 
 
Please excuse Mr. Elza Gifford from work during the hours he was attending a follow up appointment with our office. He is able to resume work. Sincerely, nAusha Bañuelos NP

## 2021-01-12 NOTE — PROGRESS NOTES
Room #: 2    2 week post AFIB ablation. Visit Vitals  /78 (BP 1 Location: Left arm, BP Patient Position: Sitting)   Pulse 64   Resp 18   Ht 5' 11\" (1.803 m)   Wt 187 lb 6.4 oz (85 kg)   SpO2 99%   BMI 26.14 kg/m²         Chest pain:  NO  Shortness of breath:  NO  Edema: NO  Palpitations, skipped beats, rapid heartbeat:  NO  Dizziness:  NO    1. Have you been to the ER, urgent care clinic since your last visit? Hospitalized since your last visit? No    2. Have you seen or consulted any other health care providers outside of the [address] [place] [address] since your last visit? Include any pap smears or colon screening.  No      Refills:  NO

## 2021-03-18 ENCOUNTER — TELEPHONE (OUTPATIENT)
Dept: CARDIOLOGY CLINIC | Age: 66
End: 2021-03-18

## 2021-03-19 RX ORDER — SIMVASTATIN 40 MG/1
40 TABLET, FILM COATED ORAL
Qty: 90 TAB | Refills: 0 | Status: SHIPPED | OUTPATIENT
Start: 2021-03-19 | End: 2021-06-14

## 2021-04-14 ENCOUNTER — OFFICE VISIT (OUTPATIENT)
Dept: CARDIOLOGY CLINIC | Age: 66
End: 2021-04-14
Payer: COMMERCIAL

## 2021-04-14 VITALS
BODY MASS INDEX: 25.48 KG/M2 | WEIGHT: 182 LBS | HEART RATE: 68 BPM | HEIGHT: 71 IN | DIASTOLIC BLOOD PRESSURE: 76 MMHG | SYSTOLIC BLOOD PRESSURE: 136 MMHG | OXYGEN SATURATION: 97 %

## 2021-04-14 DIAGNOSIS — I49.3 PVC (PREMATURE VENTRICULAR CONTRACTION): ICD-10-CM

## 2021-04-14 DIAGNOSIS — E78.5 DYSLIPIDEMIA: ICD-10-CM

## 2021-04-14 DIAGNOSIS — I48.0 PAROXYSMAL ATRIAL FIBRILLATION (HCC): Primary | ICD-10-CM

## 2021-04-14 DIAGNOSIS — Z86.79 S/P ABLATION OF ATRIAL FIBRILLATION: ICD-10-CM

## 2021-04-14 DIAGNOSIS — E78.2 MIXED HYPERLIPIDEMIA: ICD-10-CM

## 2021-04-14 DIAGNOSIS — Z98.890 S/P ABLATION OF ATRIAL FIBRILLATION: ICD-10-CM

## 2021-04-14 DIAGNOSIS — R00.1 BRADYCARDIA: ICD-10-CM

## 2021-04-14 PROCEDURE — 99215 OFFICE O/P EST HI 40 MIN: CPT | Performed by: INTERNAL MEDICINE

## 2021-04-14 RX ORDER — LORATADINE 10 MG/1
10 TABLET ORAL AS NEEDED
COMMUNITY
End: 2022-02-10 | Stop reason: ALTCHOICE

## 2021-04-14 NOTE — PROGRESS NOTES
HISTORY OF PRESENTING ILLNESS      Ana Angel is a 60 y.o. male with highly symptomatic paroxysmal atrial fibrillation for which she underwent AF ablation and now presents for follow-up. He is doing well without recurrence of atrial fibrillation. He continues on anticoagulation without issue. PAST MEDICAL HISTORY     Past Medical History:   Diagnosis Date    Atrial fibrillation [facility])     ER visit on 2/16/12 - converted to NSR with [facility];    Cardioverted in ED again 9/28/20. 2nd [facility] on 11/4/20    Bradycardia     Started seeing Dr. Navjot Zaidi and Dr. Ann Persons for this years ago    GERD (gastroesophageal reflux disease) 1998    [place]    Gout     Hypoglycemia     Mixed hyperlipidemia     Nausea & vomiting     PVC (premature ventricular contraction)            PAST SURGICAL HISTORY     Past Surgical History:   Procedure Laterality Date    ECHO STRESS  2007    normal resting LV wall motion and no evidence of ischemia. Gated EF (%): 60-65%    HX GI  1998    Nissen fundiplication    HX GI      polypectomy from colon    HX HERNIA REPAIR      Left IHR, right IHR, hiatal hernia    HX HERNIA REPAIR      HX KNEE ARTHROSCOPY  1972    right    HX OTHER SURGICAL  1989, 2005    excision 7 lipomas, excision 3 lipomas,mole excision back    HX OTHER SURGICAL      Echo 2/17/12 - Normal    HX UROLOGICAL      epididydectomy left    INTRACARD ECHO, THER/DX INTERVENT N/A 12/22/2020    Intracardiac Echocardiogram performed by Temo Hooker MD at [place] [address], [facility]/s Dr CATH LAB    LOOP MONITOR  2007    no significant arrhythmias infrequent atrial extrasystoles and ventricular extrasystoles.      [place] EPHYS EVL TRNSPTL TX ATRIAL FIB ISOLAT PULM VEIN N/A 12/22/2020    ABLATION A-FIB  W COMPLETE EP STUDY performed by Temo Hooker MD at [place] [address], [facility]/s Dr CATH LAB    OR INTRACARDIAC ELECTROPHYSIOLOGIC 3D MAPPING N/A 12/22/2020    Ep 3d Mapping performed by Temo Hooker MD at [place] [address], [facility] Dr CATH LAB    OR TOTAL KNEE ARTHROPLASTY  2008    right          ALLERGIES     Allergies   Allergen Reactions    Vioxx [Rofecoxib] Other (comments)     Mouth ulcers    Zofran Odt [Ondansetron] Nausea Only          FAMILY HISTORY     Family History   Problem Relation Age of Onset    Arrhythmia Father     negative for cardiac disease       SOCIAL HISTORY     Social History     Socioeconomic History    Marital status:      Spouse name: Not on file    Number of children: Not on file    Years of education: Not on file    Highest education level: Not on file   Tobacco Use    Smoking status: Former Smoker     Packs/day: 1.00     Years: 12.00     Pack years: 12.00     Quit date: 3/31/1986     Years since quittin.0    Smokeless tobacco: Never Used   Substance and Sexual Activity    Alcohol use: Yes     Alcohol/week: 10.0 standard drinks     Types: 10 Cans of beer per week    Drug use: No    Sexual activity: Yes     Partners: Female         MEDICATIONS     Current Outpatient Medications   Medication Sig    simvastatin (ZOCOR) 40 mg tablet Take 1 Tab by mouth nightly.  apixaban (Eliquis) 5 mg tablet Take 1 Tab by mouth two (2) times a day.  acetaminophen (TYLENOL) 500 mg tablet Take 1,000 mg by mouth every eight (8) hours as needed for Pain.  ascorbic acid, vitamin C, (Vitamin C) 500 mg tablet Take 500 mg by mouth two (2) times a day.  multivitamin, stress formula (STRESS TAB) tablet Take 1 Tab by mouth daily.  omega-3 fatty acids-vitamin e (FISH OIL) 1,000 mg Cap Take 1 Cap by mouth two (2) times a day. No current facility-administered medications for this visit. I have reviewed the nurses notes, vitals, problem list, allergy list, medical history, family, social history and medications. REVIEW OF SYMPTOMS      General: Pt denies excessive weight gain or loss. Pt is able to conduct ADL's  HEENT: Denies blurred vision, headaches, hearing loss, epistaxis and difficulty swallowing.   Respiratory: Denies cough, congestion, shortness of breath, ROSENBERG, wheezing or stridor. Cardiovascular: Denies precordial pain, palpitations, edema or PND  Gastrointestinal: Denies poor appetite, indigestion, abdominal pain or blood in stool  Genitourinary: Denies hematuria, dysuria, increased urinary frequency  Musculoskeletal: Denies joint pain or swelling from muscles or joints  Neurologic: Denies tremor, paresthesias, headache, or sensory motor disturbance  Psychiatric: Denies confusion, insomnia, depression  Integumentray: Denies rash, itching or ulcers. Hematologic: Denies easy bruising, bleeding       PHYSICAL EXAMINATION      Vitals: see vitals section  General: Well developed, in no acute distress. HEENT: No jaundice, oral mucosa moist, no oral ulcers  Neck: Supple, no stiffness, no lymphadenopathy, supple  Heart:  Normal S1/S2 negative S3 or S4. Regular, no murmur, gallop or rub, no jugular venous distention  Respiratory: Clear bilaterally x 4, no wheezing or rales  Abdomen:   Soft, non-tender, bowel sounds are active. Extremities:  No edema, normal cap refill, no cyanosis. Musculoskeletal: No clubbing, no deformities  Neuro: A&Ox3, speech clear, gait stable, cooperative, no focal neurologic deficits  Skin: Skin color is normal. No rashes or lesions.  Non diaphoretic, moist.  Vascular: 2+ pulses symmetric in all extremities       DIAGNOSTIC DATA      EKG:        LABORATORY DATA      Lab Results   Component Value Date/Time    WBC 4.6 12/14/2020 09:07 AM    Hemoglobin (POC) 15.3 02/16/2012 03:09 PM    HGB 15.4 12/14/2020 09:07 AM    Hematocrit (POC) 45 02/16/2012 03:09 PM    HCT 46.9 12/14/2020 09:07 AM    PLATELET 787 30/54/8454 09:07 AM    MCV 94 12/14/2020 09:07 AM      Lab Results   Component Value Date/Time    Sodium 142 12/14/2020 09:07 AM    Potassium 4.7 12/14/2020 09:07 AM    Chloride 104 12/14/2020 09:07 AM    CO2 26 12/14/2020 09:07 AM    Anion gap 5 11/04/2020 07:50 AM    Glucose 97 12/14/2020 09:07 AM BUN 14 12/14/2020 09:07 AM    Creatinine 1.07 12/14/2020 09:07 AM    BUN/Creatinine ratio 13 12/14/2020 09:07 AM    GFR est AA 84 12/14/2020 09:07 AM    GFR est non-AA 73 12/14/2020 09:07 AM    Calcium 10.7 (H) 12/14/2020 09:07 AM    Bilirubin, total 0.9 09/29/2020 12:00 AM    Alk. phosphatase 74 09/29/2020 12:00 AM    Protein, total 7.0 09/29/2020 12:00 AM    Albumin 4.7 09/29/2020 12:00 AM    Globulin 2.8 11/10/2013 10:35 PM    A-G Ratio 2.0 09/29/2020 12:00 AM    ALT (SGPT) 24 09/29/2020 12:00 AM           ASSESSMENT      1. Paroxysmal atrial fibrillation             A. Symptomatic             B.  AF ablation 12/2020  2. Bradycardia  3. Dyslipidemia          PLAN     Continue monitoring for clinical recurrence of atrial fibrillation and continue current drug therapy. ICD-10-CM ICD-9-CM    1. Paroxysmal atrial fibrillation (HCC)  I48.0 427.31    2. S/P ablation of atrial fibrillation  Z98.890 V45.89     Z86.79     3. Mixed hyperlipidemia  E78.2 272.2    4. PVC (premature ventricular contraction)  I49.3 427.69    5. Bradycardia  R00.1 427.89    6. Dyslipidemia  E78.5 272.4      No orders of the defined types were placed in this encounter. FOLLOW-UP     3 months  Thank you, Vida Gresham MD for allowing me to participate in the care of this extraordinarily pleasant male. Please do not hesitate to contact me for further questions/concerns.          Germaine Garcia MD  Cardiac Electrophysiology / Cardiology    [address]  [address] [facility], [facility], [address] [place] [address], [address] [place] [address]  [phone] / [phone] Fax   [phone] / [phone] Fax

## 2021-04-14 NOTE — PROGRESS NOTES
Room 3    Visit Vitals  /76 (BP 1 Location: Left upper arm, BP Patient Position: Sitting, BP Cuff Size: Adult)   Pulse 68   Ht 5' 11\" (1.803 m)   Wt 182 lb (82.6 kg)   SpO2 97%   BMI 25.38 kg/m²       Chest pain: no  Shortness of breath: no  Edema: no  Palpitations, Skipped beats, Rapid heartbeat: no  Dizziness: no    New diagnosis/Surgeries: no    ER/Hospitalizations: no    Refills: no

## 2021-04-20 ENCOUNTER — TELEPHONE (OUTPATIENT)
Dept: CARDIOLOGY CLINIC | Age: 66
End: 2021-04-20

## 2021-04-20 NOTE — TELEPHONE ENCOUNTER
Pt calling to get paperwork for days that were taken from time off due to procedure, please contact him at [phone].

## 2021-04-21 ENCOUNTER — OFFICE VISIT (OUTPATIENT)
Dept: CARDIOLOGY CLINIC | Age: 66
End: 2021-04-21
Payer: COMMERCIAL

## 2021-04-21 VITALS
BODY MASS INDEX: 25.62 KG/M2 | SYSTOLIC BLOOD PRESSURE: 122 MMHG | DIASTOLIC BLOOD PRESSURE: 68 MMHG | HEIGHT: 71 IN | OXYGEN SATURATION: 97 % | HEART RATE: 72 BPM | WEIGHT: 183 LBS

## 2021-04-21 DIAGNOSIS — I48.0 PAROXYSMAL ATRIAL FIBRILLATION (HCC): Primary | ICD-10-CM

## 2021-04-21 DIAGNOSIS — E78.2 MIXED HYPERLIPIDEMIA: ICD-10-CM

## 2021-04-21 PROCEDURE — 99214 OFFICE O/P EST MOD 30 MIN: CPT | Performed by: SPECIALIST

## 2021-04-21 NOTE — PROGRESS NOTES
Ran Capps MD. [facility]              Patient: Petra Christensen  : 1955      Today's Date: 2021            HISTORY OF PRESENT ILLNESS:     History of Present Illness:  He feels well since AF ablation  -- no complaints -- no heart racing spells. No CP or SOB or bleeding. PAST MEDICAL HISTORY:     Past Medical History:   Diagnosis Date    Atrial fibrillation [facility])     ER visit on 12 - converted to NSR with Corvert;    Cardioverted in ED again 20. 2nd [facility] on 20    Bradycardia     Started seeing Dr. Prince Watters and Dr. Anisha Keller for this years ago    GERD (gastroesophageal reflux disease)     [place]    Gout     Hypoglycemia     Mixed hyperlipidemia     Nausea & vomiting     PVC (premature ventricular contraction)          Past Surgical History:   Procedure Laterality Date    ECHO STRESS      normal resting LV wall motion and no evidence of ischemia. Gated EF (%): 60-65%    HX GI      Nissen fundiplication    HX GI      polypectomy from colon    HX HERNIA REPAIR      Left IHR, right IHR, hiatal hernia    HX HERNIA REPAIR      HX KNEE ARTHROSCOPY  1972    right    HX OTHER SURGICAL  ,     excision 7 lipomas, excision 3 lipomas,mole excision back    HX OTHER SURGICAL      Echo 12 - Normal    HX UROLOGICAL      epididydectomy left    INTRACARD ECHO, THER/DX INTERVENT N/A 2020    Intracardiac Echocardiogram performed by Norman Torres MD at [place] [address], [facility] [address] CATH LAB    LOOP MONITOR      no significant arrhythmias infrequent atrial extrasystoles and ventricular extrasystoles.      [place] EPHYS EVL TRNSPTL TX ATRIAL FIB ISOLAT PULM VEIN N/A 2020    ABLATION A-FIB  W COMPLETE EP STUDY performed by Norman Torres MD [place] [address], [facility] CATH LAB    UT INTRACARDIAC ELECTROPHYSIOLOGIC 3D MAPPING N/A 2020    Ep 3d Mapping performed by Norman Torres MD at [facility] [place] [address], [facility] [address] CATH LAB    UT TOTAL KNEE ARTHROPLASTY  2008    right MEDICATIONS:     Current Outpatient Medications   Medication Sig Dispense Refill    TURMERIC PO Take  by mouth.  loratadine (Claritin) 10 mg tablet Take 10 mg by mouth.  simvastatin (ZOCOR) 40 mg tablet Take 1 Tab by mouth nightly. 90 Tab 0    apixaban (Eliquis) 5 mg tablet Take 1 Tab by mouth two (2) times a day. 180 Tab 0    acetaminophen (TYLENOL) 500 mg tablet Take 1,000 mg by mouth every eight (8) hours as needed for Pain.  ascorbic acid, vitamin C, (Vitamin C) 500 mg tablet Take 500 mg by mouth two (2) times a day.  multivitamin, stress formula (STRESS TAB) tablet Take 1 Tab by mouth daily.  omega-3 fatty acids-vitamin e (FISH OIL) 1,000 mg Cap Take 1 Cap by mouth two (2) times a day. Allergies   Allergen Reactions    Vioxx [Rofecoxib] Other (comments)     Mouth ulcers    Zofran Odt [Ondansetron] Nausea Only             SOCIAL HISTORY:     Social History     Tobacco Use    Smoking status: Former Smoker     Packs/day: 1.00     Years: 12.00     Pack years: 12.00     Quit date: 3/31/1986     Years since quittin.0    Smokeless tobacco: Never Used   Substance Use Topics    Alcohol use: Yes     Alcohol/week: 10.0 standard drinks     Types: 10 Cans of beer per week    Drug use: No         FAMILY HISTORY:     Family History   Problem Relation Age of Onset    Arrhythmia Father             REVIEW OF SYSTEMS:       Review of Systems:   Constitutional: Negative for fever, chills   HEENT: Negative for vision changes. Respiratory: Negative for cough   Cardiovascular: Negative for orthopnea, syncope, and PND. Gastrointestinal: Negative for abdominal pain, diarrhea, or melena   Genitourinary: Negative for dysuria   Musculoskeletal: Negative for myalgias. Skin: Negative for rash   Heme: No problems bleeding.    Neurological: Negative for speech change and focal weakness.                     PHYSICAL EXAM:      Physical Exam:  Visit Vitals  /68 (BP 1 Location: Left upper arm, BP Patient Position: Sitting, BP Cuff Size: Adult)   Pulse 72   Ht 5' 11\" (1.803 m)   Wt 183 lb (83 kg)   SpO2 97%   BMI 25.52 kg/m²          Patient appears generally well, mood and affect are appropriate and pleasant. HEENT:  Hearing intact, non-icteric, normocephalic, atraumatic. Neck Exam: Supple, No JVD    Lung Exam: Clear to auscultation, even breath sounds. Cardiac Exam: Regular rate and rhythm with no murmur  Abdomen: Soft, non-tender, normal bowel sounds.    Extremities: Moves all ext well. No lower extremity edema. No major rashes  Good ROM Ext  Psych: Appropriate affect  Neuro - Grossly intact                 LABS / OTHER STUDIES:           Labs - 7/16/12 - CBC and CMP normal; chol 142, TG 95, LDL 68, HDL 55   Labs 12/11/14 - chol 154, TG 68, HDL 72, LDL 68, Ca 11, K 4.1, Cr 1.07, LFT's OK     Lab Results   Component Value Date/Time    Sodium 142 12/14/2020 09:07 AM    Potassium 4.7 12/14/2020 09:07 AM    Chloride 104 12/14/2020 09:07 AM    CO2 26 12/14/2020 09:07 AM    Anion gap 5 11/04/2020 07:50 AM    Glucose 97 12/14/2020 09:07 AM    BUN 14 12/14/2020 09:07 AM    Creatinine 1.07 12/14/2020 09:07 AM    BUN/Creatinine ratio 13 12/14/2020 09:07 AM    GFR est AA 84 12/14/2020 09:07 AM    GFR est non-AA 73 12/14/2020 09:07 AM    Calcium 10.7 (H) 12/14/2020 09:07 AM    Bilirubin, total 0.9 09/29/2020 12:00 AM    Alk.  phosphatase 74 09/29/2020 12:00 AM    Protein, total 7.0 09/29/2020 12:00 AM    Albumin 4.7 09/29/2020 12:00 AM    Globulin 2.8 11/10/2013 10:35 PM    A-G Ratio 2.0 09/29/2020 12:00 AM    ALT (SGPT) 24 09/29/2020 12:00 AM    AST (SGOT) 21 09/29/2020 12:00 AM     Lab Results   Component Value Date/Time    WBC 4.6 12/14/2020 09:07 AM    Hemoglobin (POC) 15.3 02/16/2012 03:09 PM    HGB 15.4 12/14/2020 09:07 AM    Hematocrit (POC) 45 02/16/2012 03:09 PM    HCT 46.9 12/14/2020 09:07 AM    PLATELET 654 53/77/9888 09:07 AM    MCV 94 12/14/2020 09:07 AM     Lab Results   Component Value Date/Time    Cholesterol, total 152 09/29/2020 12:00 AM    HDL Cholesterol 68 09/29/2020 12:00 AM    LDL, calculated 70 09/29/2020 12:00 AM    LDL, calculated 76 10/02/2013 12:00 AM    VLDL, calculated 14 09/29/2020 12:00 AM    VLDL, calculated 14 10/02/2013 12:00 AM    Triglyceride 69 09/29/2020 12:00 AM                 CARDIAC DIAGNOSTICS:       Cardiac Evaluation Includes:  Treadmill stress test 12/2/13 - walked 10:04 (11.8 METS). Sinus bradycardia at rest. Stress  bpm (94% MPHR). No ischemic EKG changes. Frequent PVC's in exercise and recovery (asymptomatic). Echo 12/2/13 - LVEF 60-65%.  Mild LAE.      Echo 10/26/20 - LVEF 56%, mild LAE     Exercise Cardiolite 10/26/20 - walked 10 min (13.4 METS), No CP or ischemic EKG changes.  Frequent PVC's during exercise (PVC\"s in bigeminy at times).  Normal MPI.  LVEF 55% --- He had similar PVC's with exercise in 2013.        AF Ablation 12/22/20           EKG - 7/18/12 - NSR, HR 61, normal   EKG - 9/30/13 - NSR, normal  EKG 11/17/14 - sinus bradycardia, HR 50  EKG 11/30/15 - sinus bradycardia, HR 49  EKG 1/25/17 - sinus bradycardia, HR 55   EKG 1/31/18 - sinus bradycardia, HR 54     EKG 2/13/19 - sinus bradycardia, HR 47  EKG 2/19/20 - sinus janny, HR 47   EKG 9/28/20 - Afib,   EKG 11/4/20 - Afib, HR 95   EKG 11/4/20  - sinus janny, HR 44 -- after [address]   EKG 1/12/21 - NSR, PAC             ASSESSMENT AND PLAN:       Assessment and Plan:     1) Paroxysmal Afib   - First Afib 2/12 (received Corevert then);     - Cardioverted for Afib 9/28/20   - Cardioverted again for Afib 11/4/20   - Had AF ablation 12/22/20  --> No problems since then   - His [id number] score is 0 -- but almost 1 (turns 73 in July 2021) --Continue Eliquis as long as EP recommends - he sees Dr. Bryant All in 3 months and I'll defer to him       2) Chronic resting bradycardia   - asymptomatic   - Event monitor in 2007 unremarkable   - Has normal HR response to exercise during stress tests   - He is active without complaints      3) Dyslipidemia   - Cont simvastatin - lipids followed by PCP      4) High Calcium   - asked him again to see PCP to follow-up      5)  See me in 12 months. Patient expressed understanding of the plan - questions were answered. He is a pipe filter and . Rigo Chua has a rescue dog. Dustin Sullivan cruising.              Liset Fernandez MD, [address] [place] [address], [facility] N. Danielle Hurt.                 [place], [address] [place] [address]  Ph: [phone]                               Ph [phone]

## 2021-04-21 NOTE — PROGRESS NOTES
Room 4    Visit Vitals  /68 (BP 1 Location: Left upper arm, BP Patient Position: Sitting, BP Cuff Size: Adult)   Pulse 72   Ht 5' 11\" (1.803 m)   Wt 183 lb (83 kg)   SpO2 97%   BMI 25.52 kg/m²           Chest pain: no  Shortness of breath: no  Edema: no  Palpitations: no  Dizziness: no    New diagnosis/Surgeries: no    ER/Hospitalizations: no    Refills: no

## 2021-04-22 NOTE — TELEPHONE ENCOUNTER
Returned patient call, ID verified using two patient identifiers. Patient states he was in to see Dr. Andrew Epstein for a office visit yesterday. He says he gave [facility] paperwork to [employer]. He is asking if it can all be filled out and sent back as soon as possible. Advised patient that I will speak with Eligio Hernandez and help expedite getting the paperwork completed. Patient verbalized understanding and will call with any other questions.

## 2021-04-23 ENCOUNTER — TELEPHONE (OUTPATIENT)
Dept: CARDIOLOGY CLINIC | Age: 66
End: 2021-04-23

## 2021-04-23 NOTE — TELEPHONE ENCOUNTER
When pt was in office, he stated that he has been having trouble with his intermittent FMLA. He stated that he needs us to request more time for his FMLA. He gave me the number to his advocate, [phone]. Called [place],  Leave is closed. She took down all information, stated she thinks the case will need to be reopen. Will be in touch with pt. I have pt's previously filled out FMLA paperwork at my desk.

## 2021-05-06 NOTE — TELEPHONE ENCOUNTER
Patient called again regarding his intermittent FMLA, Patient is states he requires approval from his provider for additional time for procedures (time for ablation an other procedures)  Denial dates   09- 11- 12- 12- 01-    PHONE:[phone]

## 2021-05-06 NOTE — TELEPHONE ENCOUNTER
[place], [phone] x [phone]  Let her know that we were waiting on a fax to increase the amount of leave time for pt. She stated there was no notation of a call last month. He was given 16 hrs 1x every 6 months. This needed to be increased. She is faxing new paperwork. Spoke with Christina;  Case lead: rAaseli Sparks

## 2021-06-14 RX ORDER — SIMVASTATIN 40 MG/1
TABLET, FILM COATED ORAL
Qty: 90 TABLET | Refills: 3 | Status: SHIPPED | OUTPATIENT
Start: 2021-06-14 | End: 2022-05-17

## 2021-06-14 NOTE — TELEPHONE ENCOUNTER
Per Dr. Ann Goodwin VO:  [facility]  Future Appointments   Date Time Provider Mary Ju   7/19/2021  8:40 AM Iris Dominguez MD CAVSF BS AMB   4/21/2022  9:20 AM MD NED PedrazaSF BS AMB     Requested Prescriptions     Pending Prescriptions Disp Refills    simvastatin (ZOCOR) 40 mg tablet [Pharmacy Med Name: SIMVASTATIN 40 MG TABLET] 90 Tablet 0     Sig: TAKE 1 TABLET BY MOUTH EVERY DAY AT NIGHT

## 2021-07-19 ENCOUNTER — OFFICE VISIT (OUTPATIENT)
Dept: CARDIOLOGY CLINIC | Age: 66
End: 2021-07-19
Payer: MEDICARE

## 2021-07-19 VITALS
SYSTOLIC BLOOD PRESSURE: 126 MMHG | WEIGHT: 179 LBS | DIASTOLIC BLOOD PRESSURE: 72 MMHG | HEIGHT: 71 IN | OXYGEN SATURATION: 97 % | BODY MASS INDEX: 25.06 KG/M2 | HEART RATE: 64 BPM

## 2021-07-19 DIAGNOSIS — I48.0 PAROXYSMAL ATRIAL FIBRILLATION (HCC): Primary | ICD-10-CM

## 2021-07-19 PROCEDURE — G8510 SCR DEP NEG, NO PLAN REQD: HCPCS | Performed by: INTERNAL MEDICINE

## 2021-07-19 PROCEDURE — G8420 CALC BMI NORM PARAMETERS: HCPCS | Performed by: INTERNAL MEDICINE

## 2021-07-19 PROCEDURE — 3017F COLORECTAL CA SCREEN DOC REV: CPT | Performed by: INTERNAL MEDICINE

## 2021-07-19 PROCEDURE — 1101F PT FALLS ASSESS-DOCD LE1/YR: CPT | Performed by: INTERNAL MEDICINE

## 2021-07-19 PROCEDURE — G8427 DOCREV CUR MEDS BY ELIG CLIN: HCPCS | Performed by: INTERNAL MEDICINE

## 2021-07-19 PROCEDURE — 99215 OFFICE O/P EST HI 40 MIN: CPT | Performed by: INTERNAL MEDICINE

## 2021-07-19 PROCEDURE — G8536 NO DOC ELDER MAL SCRN: HCPCS | Performed by: INTERNAL MEDICINE

## 2021-07-19 NOTE — PROGRESS NOTES
Room 2     Visit Vitals  /72   Pulse 64   Ht 5' 11\" (1.803 m)   Wt 179 lb (81.2 kg)   SpO2 97%   BMI 24.97 kg/m²       Chest pain: no  Shortness of breath: no  Edema: no  Palpitations, Skipped beats, Rapid heartbeat: no  Dizziness: no    New diagnosis/Surgeries: no    ER/Hospitalizations: no    Refills: no

## 2021-07-19 NOTE — TELEPHONE ENCOUNTER
Patient requesting a refill on eliquis 5 mg, patient would like 90 days,  [place] [facility] [phone]

## 2021-07-19 NOTE — LETTER
7/19/2021    Patient: Loree Krueger   YOB: 1955   Date of Visit: 7/19/2021     [phone]  Via Fax: [phone]    Dear Clarisse Mazariegos MD,      Thank you for referring Mr. Bryson Home to [address] for evaluation. My notes for this consultation are attached. If you have questions, please do not hesitate to call me. I look forward to following your patient along with you.       Sincerely,    Satish Sinclair MD

## 2021-07-19 NOTE — PROGRESS NOTES
HISTORY OF PRESENTING ILLNESS      Rona Doing is a 73 y.o. male with highly symptomatic paroxysmal atrial fibrillation for which she underwent AF ablation. Denied symptomatic recurrence. Discussed option of discontinuing anticoagulation. PAST MEDICAL HISTORY     Past Medical History:   Diagnosis Date    Atrial fibrillation [facility])     ER visit on 2/16/12 - converted to NSR with Corvert;    Cardioverted in ED again 9/28/20. 2nd [facility] on 11/4/20    Bradycardia     Started seeing Dr. Yamila Lewis and Dr. Janna High for this years ago    GERD (gastroesophageal reflux disease) 1998    [place]    Gout     Hypoglycemia     Mixed hyperlipidemia     Nausea & vomiting     PVC (premature ventricular contraction)            PAST SURGICAL HISTORY     Past Surgical History:   Procedure Laterality Date    ECHO STRESS  2007    normal resting LV wall motion and no evidence of ischemia. Gated EF (%): 60-65%    HX GI  1998    Nissen fundiplication    HX GI      polypectomy from colon    HX HERNIA REPAIR      Left IHR, right IHR, hiatal hernia    HX HERNIA REPAIR      HX KNEE ARTHROSCOPY  1972    right    HX OTHER SURGICAL  1989, 2005    excision 7 lipomas, excision 3 lipomas,mole excision back    HX OTHER SURGICAL      Echo 2/17/12 - Normal    HX UROLOGICAL      epididydectomy left    INTRACARD ECHO, THER/DX INTERVENT N/A 12/22/2020    Intracardiac Echocardiogram performed by Sri Hernandez MD at [place] [address], [facility] CATH LAB    LOOP MONITOR  2007    no significant arrhythmias infrequent atrial extrasystoles and ventricular extrasystoles.      [place] EPHYS EVL TRNSPTL TX ATRIAL FIB ISOLAT PULM VEIN N/A 12/22/2020    ABLATION A-FIB  W COMPLETE EP STUDY performed by Sri Hernandez MD at [place] [address], [facility] CATH LAB    [place] INTRACARDIAC ELECTROPHYSIOLOGIC 3D MAPPING N/A 12/22/2020    Ep 3d Mapping [facility] [address] CATH LAB    [place] TOTAL KNEE ARTHROPLASTY  2008    right          ALLERGIES     Allergies Allergen Reactions    Vioxx [Rofecoxib] Other (comments)     Mouth ulcers    Zofran Odt [Ondansetron] Nausea Only          FAMILY HISTORY     Family History   Problem Relation Age of Onset    Arrhythmia Father     negative for cardiac disease       SOCIAL HISTORY     Social History     Socioeconomic History    Marital status:      Spouse name: Not on file    Number of children: Not on file    Years of education: Not on file    Highest education level: Not on file   Tobacco Use    Smoking status: Former Smoker     Packs/day: 1.00     Years: 12.00     Pack years: 12.00     Quit date: 3/31/1986     Years since quittin.3    Smokeless tobacco: Never Used   Substance and Sexual Activity    Alcohol use: Yes     Alcohol/week: 10.0 standard drinks     Types: 10 Cans of beer per week    Drug use: No    Sexual activity: Yes     Partners: Female     Social Determinants of Health     Financial Resource Strain:     Difficulty of Paying Living Expenses:    Food Insecurity:     Worried About Running Out of Food in the Last Year:     [address] in the Last Year:    Transportation Needs:     Lack of Transportation (Medical):  Lack of Transportation (Non-Medical):    Physical Activity:     Days of Exercise per Week:     Minutes of Exercise per Session:    Stress:     Feeling of Stress :    Social Connections:     Frequency of Communication with Friends and Family:     Frequency of Social Gatherings with Friends and Family:     Attends Restoration Services:     Active Member of Clubs or Organizations:     Attends Club or Organization Meetings:     Marital Status:          MEDICATIONS     Current Outpatient Medications   Medication Sig    simvastatin (ZOCOR) 40 mg tablet TAKE 1 TABLET BY MOUTH EVERY DAY AT NIGHT    TURMERIC PO Take  by mouth.  loratadine (Claritin) 10 mg tablet Take 10 mg by mouth as needed.  apixaban (Eliquis) 5 mg tablet Take 1 Tab by mouth two (2) times a day.     acetaminophen (TYLENOL) 500 mg tablet Take 1,000 mg by mouth every eight (8) hours as needed for Pain.  ascorbic acid, vitamin C, (Vitamin C) 500 mg tablet Take 500 mg by mouth two (2) times a day.  multivitamin, stress formula (STRESS TAB) tablet Take 1 Tab by mouth daily.  omega-3 fatty acids-vitamin e (FISH OIL) 1,000 mg Cap Take 1 Cap by mouth two (2) times a day. No current facility-administered medications for this visit. I have reviewed the nurses notes, vitals, problem list, allergy list, medical history, family, social history and medications. REVIEW OF SYMPTOMS      General: Pt denies excessive weight gain or loss. Pt is able to conduct ADL's  HEENT: Denies blurred vision, headaches, hearing loss, epistaxis and difficulty swallowing. Respiratory: Denies cough, congestion, shortness of breath, ROSENBERG, wheezing or stridor. Cardiovascular: Denies precordial pain, palpitations, edema or PND  Gastrointestinal: Denies poor appetite, indigestion, abdominal pain or blood in stool  Genitourinary: Denies hematuria, dysuria, increased urinary frequency  Musculoskeletal: Denies joint pain or swelling from muscles or joints  Neurologic: Denies tremor, paresthesias, headache, or sensory motor disturbance  Psychiatric: Denies confusion, insomnia, depression  Integumentray: Denies rash, itching or ulcers. Hematologic: Denies easy bruising, bleeding       PHYSICAL EXAMINATION      Vitals: see vitals section  General: Well developed, in no acute distress. HEENT: No jaundice, oral mucosa moist, no oral ulcers  Neck: Supple, no stiffness, no lymphadenopathy, supple  Heart:  Normal S1/S2 negative S3 or S4. Regular, no murmur, gallop or rub, no jugular venous distention  Respiratory: Clear bilaterally x 4, no wheezing or rales  Abdomen:   Soft, non-tender, bowel sounds are active. Extremities:  No edema, normal cap refill, no cyanosis.   Musculoskeletal: No clubbing, no deformities  Neuro: A&Ox3, speech clear, gait stable, cooperative, no focal neurologic deficits  Skin: Skin color is normal. No rashes or lesions. Non diaphoretic, moist.  Vascular: 2+ pulses symmetric in all extremities       DIAGNOSTIC DATA      EKG:   Visit Vitals  /72   Pulse 64   Ht 5' 11\" (1.803 m)   Wt 179 lb (81.2 kg)   SpO2 97%   BMI 24.97 kg/m²        LABORATORY DATA      Lab Results   Component Value Date/Time    WBC 4.6 12/14/2020 09:07 AM    Hemoglobin (POC) 15.3 02/16/2012 03:09 PM    HGB 15.4 12/14/2020 09:07 AM    Hematocrit (POC) 45 02/16/2012 03:09 PM    HCT 46.9 12/14/2020 09:07 AM    PLATELET 784 56/24/8026 09:07 AM    MCV 94 12/14/2020 09:07 AM      Lab Results   Component Value Date/Time    Sodium 142 12/14/2020 09:07 AM    Potassium 4.7 12/14/2020 09:07 AM    Chloride 104 12/14/2020 09:07 AM    CO2 26 12/14/2020 09:07 AM    Anion gap 5 11/04/2020 07:50 AM    Glucose 97 12/14/2020 09:07 AM    BUN 14 12/14/2020 09:07 AM    Creatinine 1.07 12/14/2020 09:07 AM    BUN/Creatinine ratio 13 12/14/2020 09:07 AM    GFR est AA 84 12/14/2020 09:07 AM    GFR est non-AA 73 12/14/2020 09:07 AM    Calcium 10.7 (H) 12/14/2020 09:07 AM    Bilirubin, total 0.9 09/29/2020 12:00 AM    Alk. phosphatase 74 09/29/2020 12:00 AM    Protein, total 7.0 09/29/2020 12:00 AM    Albumin 4.7 09/29/2020 12:00 AM    Globulin 2.8 11/10/2013 10:35 PM    A-G Ratio 2.0 09/29/2020 12:00 AM    ALT (SGPT) 24 09/29/2020 12:00 AM           ASSESSMENT      1. Paroxysmal atrial fibrillation             A. Symptomatic             B.  AF ablation 12/2020  2. Bradycardia  3. Dyslipidemia        PLAN     Continue monitoring for clinical recurrence. He is favoring staying on eliquis for now. ICD-10-CM ICD-9-CM    1. Paroxysmal atrial fibrillation (HCC)  I48.0 427.31      No orders of the defined types were placed in this encounter.          FOLLOW-UP     6 months        Thank you, Francisco J Sheldon MD for allowing me to participate in the care of this extraordinarily pleasant male. Please do not hesitate to contact me for further questions/concerns.          Sloan Sorensen MD  Cardiac Electrophysiology / Cardiology    [facility] [address]  [address] [facility], [facility], [address]  [phone] / [phone] Fax   [phone] / [phone] Fax

## 2022-01-03 RX ORDER — APIXABAN 5 MG/1
TABLET, FILM COATED ORAL
Qty: 180 TABLET | Refills: 1 | Status: SHIPPED | OUTPATIENT
Start: 2022-01-03 | End: 2022-05-05

## 2022-02-10 ENCOUNTER — OFFICE VISIT (OUTPATIENT)
Dept: CARDIOLOGY CLINIC | Age: 67
End: 2022-02-10
Payer: MEDICARE

## 2022-02-10 VITALS
WEIGHT: 184 LBS | HEIGHT: 71 IN | OXYGEN SATURATION: 97 % | HEART RATE: 68 BPM | RESPIRATION RATE: 16 BRPM | BODY MASS INDEX: 25.76 KG/M2 | SYSTOLIC BLOOD PRESSURE: 118 MMHG | DIASTOLIC BLOOD PRESSURE: 86 MMHG

## 2022-02-10 DIAGNOSIS — E78.5 DYSLIPIDEMIA: ICD-10-CM

## 2022-02-10 DIAGNOSIS — Z98.890 S/P ABLATION OF ATRIAL FIBRILLATION: ICD-10-CM

## 2022-02-10 DIAGNOSIS — E78.2 MIXED HYPERLIPIDEMIA: ICD-10-CM

## 2022-02-10 DIAGNOSIS — R00.1 BRADYCARDIA: ICD-10-CM

## 2022-02-10 DIAGNOSIS — Z86.79 S/P ABLATION OF ATRIAL FIBRILLATION: ICD-10-CM

## 2022-02-10 DIAGNOSIS — I48.0 PAROXYSMAL ATRIAL FIBRILLATION (HCC): Primary | ICD-10-CM

## 2022-02-10 PROCEDURE — G8427 DOCREV CUR MEDS BY ELIG CLIN: HCPCS | Performed by: NURSE PRACTITIONER

## 2022-02-10 PROCEDURE — G8432 DEP SCR NOT DOC, RNG: HCPCS | Performed by: NURSE PRACTITIONER

## 2022-02-10 PROCEDURE — G8419 CALC BMI OUT NRM PARAM NOF/U: HCPCS | Performed by: NURSE PRACTITIONER

## 2022-02-10 PROCEDURE — 99214 OFFICE O/P EST MOD 30 MIN: CPT | Performed by: NURSE PRACTITIONER

## 2022-02-10 PROCEDURE — 3017F COLORECTAL CA SCREEN DOC REV: CPT | Performed by: NURSE PRACTITIONER

## 2022-02-10 PROCEDURE — 93000 ELECTROCARDIOGRAM COMPLETE: CPT | Performed by: NURSE PRACTITIONER

## 2022-02-10 PROCEDURE — G8536 NO DOC ELDER MAL SCRN: HCPCS | Performed by: NURSE PRACTITIONER

## 2022-02-10 PROCEDURE — 1101F PT FALLS ASSESS-DOCD LE1/YR: CPT | Performed by: NURSE PRACTITIONER

## 2022-02-10 RX ORDER — MELATONIN
1000 DAILY
COMMUNITY

## 2022-02-10 NOTE — PROGRESS NOTES
HISTORY OF PRESENTING ILLNESS      Ricky Bell is a 73 y.o. male with dyslipidemia, bradycardia s/p  AF ablation on 12/22/2020. He denies recurrence of AF and has favored continuing Eliquis for CVA risk reduction. He is a CHADs VASC 1. PAST MEDICAL HISTORY     Past Medical History:   Diagnosis Date    Atrial fibrillation [facility])     ER visit on 2/16/12 - converted to NSR with Corvert;    Cardioverted in ED again 9/28/20. [address] [place] [address] on 11/4/20    Bradycardia     Started seeing Dr. Lucio Rodriguez and Dr. Cheko Tobias for this years ago    GERD (gastroesophageal reflux disease) 1998    [place]    Gout     Hypoglycemia     Mixed hyperlipidemia     Nausea & vomiting     PVC (premature ventricular contraction)            PAST SURGICAL HISTORY     Past Surgical History:   Procedure Laterality Date    ECHO STRESS  2007    normal resting LV wall motion and no evidence of ischemia. Gated EF (%): 60-65%    HX GI  1998    Nissen fundiplication    HX GI      polypectomy from colon    HX HERNIA REPAIR      Left IHR, right IHR, hiatal hernia    HX HERNIA REPAIR      HX KNEE ARTHROSCOPY  1972    right    HX OTHER SURGICAL  1989, 2005    excision 7 lipomas, excision 3 lipomas,mole excision back    HX OTHER SURGICAL      Echo 2/17/12 - Normal    HX UROLOGICAL      epididydectomy left    LOOP MONITOR  2007    no significant arrhythmias infrequent atrial extrasystoles and ventricular extrasystoles.      MA COMPRE EP EVAL ABLTJ ATR FIB PULM VEIN ISOLATION N/A 12/22/2020    ABLATION A-FIB  W COMPLETE EP STUDY performed by Alejandra Laboy MD at [address] CATH LAB    MA INTRACARD ECHO, THER/DX INTERVENT N/A 12/22/2020    Intracardiac Echocardiogram performed by Alejandra Laboy MD at [address] CATH LAB    MA INTRACARDIAC ELECTROPHYSIOLOGIC 3D MAPPING N/A 12/22/2020    [facility]    MA TOTAL KNEE ARTHROPLASTY  2008    right          ALLERGIES     Allergies Allergen Reactions    Vioxx [Rofecoxib] Other (comments)     Mouth ulcers    Zofran Odt [Ondansetron] Nausea Only          FAMILY HISTORY     Family History   Problem Relation Age of Onset    Arrhythmia Father     negative for cardiac disease       SOCIAL HISTORY     Social History     Socioeconomic History    Marital status:    Tobacco Use    Smoking status: Former Smoker     Packs/day: 1.00     Years: 12.00     Pack years: 12.00     Quit date: 3/31/1986     Years since quittin.8    Smokeless tobacco: Never Used   Substance and Sexual Activity    Alcohol use: Yes     Alcohol/week: 10.0 standard drinks     Types: 10 Cans of beer per week    Drug use: No    Sexual activity: Yes     Partners: Female         MEDICATIONS     Current Outpatient Medications   Medication Sig    cholecalciferol (VITAMIN D3) (1000 Units /25 mcg) tablet Take 1,000 Units by mouth daily.  Eliquis 5 mg tablet TAKE 1 TABLET TWICE DAILY    simvastatin (ZOCOR) 40 mg tablet TAKE 1 TABLET BY MOUTH EVERY DAY AT NIGHT    TURMERIC PO Take 450 mg by mouth.  acetaminophen (TYLENOL) 500 mg tablet Take 1,000 mg by mouth every eight (8) hours as needed for Pain.  ascorbic acid, vitamin C, (Vitamin C) 500 mg tablet Take 500 mg by mouth two (2) times a day.  multivitamin, stress formula (STRESS TAB) tablet Take 1 Tab by mouth daily.  omega-3 fatty acids-vitamin e (FISH OIL) 1,000 mg Cap Take 1 Cap by mouth two (2) times a day.  loratadine (Claritin) 10 mg tablet Take 10 mg by mouth as needed. (Patient not taking: Reported on 2/10/2022)     No current facility-administered medications for this visit. I have reviewed the nurses notes, vitals, problem list, allergy list, medical history, family, social history and medications. REVIEW OF SYMPTOMS      General: Pt denies excessive weight gain or loss.  Pt is able to conduct ADL's  HEENT: Denies blurred vision, headaches, hearing loss, epistaxis and difficulty swallowing. Respiratory: Denies cough, congestion, shortness of breath, ROSENBERG, wheezing or stridor. Cardiovascular: Denies precordial pain, palpitations, edema or PND  Gastrointestinal: Denies poor appetite, indigestion, abdominal pain or blood in stool  Genitourinary: Denies hematuria, dysuria, increased urinary frequency  Musculoskeletal: Denies joint pain or swelling from muscles or joints  Neurologic: Denies tremor, paresthesias, headache, or sensory motor disturbance  Psychiatric: Denies confusion, insomnia, depression  Integumentray: Denies rash, itching or ulcers. Hematologic: Denies easy bruising, bleeding       PHYSICAL EXAMINATION      Vitals: see vitals section  General: Well developed, in no acute distress. HEENT: No jaundice, oral mucosa moist, no oral ulcers  Neck: Supple, no stiffness, no lymphadenopathy, supple  Heart:  Normal S1/S2 negative S3 or S4. Regular, no murmur, gallop or rub, no jugular venous distention  Respiratory: Clear bilaterally x 4, no wheezing or rales  Abdomen:   Soft, non-tender, bowel sounds are active. Extremities:  No edema, normal cap refill, no cyanosis. Musculoskeletal: No clubbing, no deformities  Neuro: A&Ox3, speech clear, gait stable, cooperative, no focal neurologic deficits  Skin: Skin color is normal. No rashes or lesions.  Non diaphoretic, moist.  Vascular: 2+ pulses symmetric in all extremities       DIAGNOSTIC DATA      EKG: sinus rhythm     Visit Vitals  /86 (BP 1 Location: Left upper arm, BP Patient Position: Sitting, BP Cuff Size: Adult)   Pulse 68   Resp 16   Ht 5' 11\" (1.803 m)   Wt 184 lb (83.5 kg)   SpO2 97%   BMI 25.66 kg/m²        LABORATORY DATA      Lab Results   Component Value Date/Time    WBC 4.6 12/14/2020 09:07 AM    Hemoglobin (POC) 15.3 02/16/2012 03:09 PM    HGB 15.4 12/14/2020 09:07 AM    Hematocrit (POC) 45 02/16/2012 03:09 PM    HCT 46.9 12/14/2020 09:07 AM    PLATELET 554 61/31/3500 09:07 AM    MCV 94 12/14/2020 09:07 AM      Lab Results   Component Value Date/Time    Sodium 142 12/14/2020 09:07 AM    Potassium 4.7 12/14/2020 09:07 AM    Chloride 104 12/14/2020 09:07 AM    CO2 26 12/14/2020 09:07 AM    Anion gap 5 11/04/2020 07:50 AM    Glucose 97 12/14/2020 09:07 AM    BUN 14 12/14/2020 09:07 AM    Creatinine 1.07 12/14/2020 09:07 AM    BUN/Creatinine ratio 13 12/14/2020 09:07 AM    GFR est AA 84 12/14/2020 09:07 AM    GFR est non-AA 73 12/14/2020 09:07 AM    Calcium 10.7 (H) 12/14/2020 09:07 AM    Bilirubin, total 0.9 09/29/2020 12:00 AM    Alk. phosphatase 74 09/29/2020 12:00 AM    Protein, total 7.0 09/29/2020 12:00 AM    Albumin 4.7 09/29/2020 12:00 AM    Globulin 2.8 11/10/2013 10:35 PM    A-G Ratio 2.0 09/29/2020 12:00 AM    ALT (SGPT) 24 09/29/2020 12:00 AM           ASSESSMENT      1. Paroxysmal atrial fibrillation             A. Symptomatic             B.  AF ablation 12/2020  2. Bradycardia  3. Dyslipidemia        PLAN     Continue monitoring for clinical recurrence. He is favoring staying on eliquis for now; however, we discussed his Chads Vasc score of 1 for age and that he can stop Eliquis and resume ASA 81mg. He will consider this. I provided him with labwork to have done prior to his next appointment with Dr. Jt Glez. He knows to be fasting for that labwork. FOLLOW-UP   1 year    Thank you, Loco Angel MD for allowing me to participate in the care of this extraordinarily pleasant male. Please do not hesitate to contact me for further questions/concerns.      ROULA [facility].  [address] [place] [address]  [phone] / [phone] Fax   [phone] / [phone] Fax

## 2022-02-10 NOTE — PROGRESS NOTES
Room #: 2      Visit Vitals  /86 (BP 1 Location: Left upper arm, BP Patient Position: Sitting, BP Cuff Size: Adult)   Pulse 68   Resp 16   Ht 5' 11\" (1.803 m)   Wt 184 lb (83.5 kg)   SpO2 97%   BMI 25.66 kg/m²         Chest pain:  NO  Shortness of breath:  NO  Edema: NO  Palpitations, skipped beats, rapid heartbeat:  NO  Dizziness:  NO    1. Have you been to the ER, urgent care clinic since your last visit? Hospitalized since your last visit? No    2. Have you seen or consulted any other health care providers outside of the [address] [place] [address] since your last visit? Include any pap smears or colon screening.  No      Refills:  NO

## 2022-02-10 NOTE — LETTER
2/10/2022    Patient: Coty Silver   YOB: 1955   Date of Visit: 2/10/2022     Marques Batres MD    [facility]  [address]  Via Fax: [phone]    Dear aMrques Batres MD,      Thank you for referring Mr. Lennox Beverage to [address]  for evaluation. My notes for this consultation are attached. If you have questions, please do not hesitate to call me. I look forward to following your patient along with you.       Sincerely,    Didier Arenas NP

## 2022-04-02 LAB
BASOPHILS # BLD AUTO: 0.1 X10E3/UL (ref 0–0.2)
BASOPHILS NFR BLD AUTO: 2 %
BUN SERPL-MCNC: 15 MG/DL (ref 8–27)
BUN/CREAT SERPL: 14 (ref 10–24)
CALCIUM SERPL-MCNC: 10.3 MG/DL (ref 8.6–10.2)
CHLORIDE SERPL-SCNC: 106 MMOL/L (ref 96–106)
CHOLEST SERPL-MCNC: 153 MG/DL (ref 100–199)
CO2 SERPL-SCNC: 24 MMOL/L (ref 20–29)
CREAT SERPL-MCNC: 1.09 MG/DL (ref 0.76–1.27)
EGFR: 75 ML/MIN/1.73
EOSINOPHIL # BLD AUTO: 0.2 X10E3/UL (ref 0–0.4)
EOSINOPHIL NFR BLD AUTO: 4 %
ERYTHROCYTE [DISTWIDTH] IN BLOOD BY AUTOMATED COUNT: 11.7 % (ref 11.6–15.4)
GLUCOSE SERPL-MCNC: 98 MG/DL (ref 65–99)
HCT VFR BLD AUTO: 47.5 % (ref 37.5–51)
HDLC SERPL-MCNC: 62 MG/DL
HGB BLD-MCNC: 15.6 G/DL (ref 13–17.7)
IMM GRANULOCYTES # BLD AUTO: 0 X10E3/UL (ref 0–0.1)
IMM GRANULOCYTES NFR BLD AUTO: 0 %
IMP & REVIEW OF LAB RESULTS: NORMAL
LDLC SERPL CALC-MCNC: 80 MG/DL (ref 0–99)
LYMPHOCYTES # BLD AUTO: 1.7 X10E3/UL (ref 0.7–3.1)
LYMPHOCYTES NFR BLD AUTO: 35 %
MCH RBC QN AUTO: 30.6 PG (ref 26.6–33)
MCHC RBC AUTO-ENTMCNC: 32.8 G/DL (ref 31.5–35.7)
MCV RBC AUTO: 93 FL (ref 79–97)
MONOCYTES # BLD AUTO: 0.4 X10E3/UL (ref 0.1–0.9)
MONOCYTES NFR BLD AUTO: 9 %
NEUTROPHILS # BLD AUTO: 2.5 X10E3/UL (ref 1.4–7)
NEUTROPHILS NFR BLD AUTO: 50 %
PLATELET # BLD AUTO: 228 X10E3/UL (ref 150–450)
POTASSIUM SERPL-SCNC: 4.3 MMOL/L (ref 3.5–5.2)
RBC # BLD AUTO: 5.09 X10E6/UL (ref 4.14–5.8)
SODIUM SERPL-SCNC: 144 MMOL/L (ref 134–144)
TRIGL SERPL-MCNC: 52 MG/DL (ref 0–149)
VLDLC SERPL CALC-MCNC: 11 MG/DL (ref 5–40)
WBC # BLD AUTO: 4.9 X10E3/UL (ref 3.4–10.8)

## 2022-05-05 ENCOUNTER — OFFICE VISIT (OUTPATIENT)
Dept: CARDIOLOGY CLINIC | Age: 67
End: 2022-05-05
Payer: MEDICARE

## 2022-05-05 VITALS
BODY MASS INDEX: 25.9 KG/M2 | HEART RATE: 60 BPM | HEIGHT: 71 IN | OXYGEN SATURATION: 97 % | DIASTOLIC BLOOD PRESSURE: 80 MMHG | SYSTOLIC BLOOD PRESSURE: 122 MMHG | WEIGHT: 185 LBS

## 2022-05-05 DIAGNOSIS — I48.0 PAROXYSMAL ATRIAL FIBRILLATION (HCC): Primary | ICD-10-CM

## 2022-05-05 DIAGNOSIS — E78.2 MIXED HYPERLIPIDEMIA: ICD-10-CM

## 2022-05-05 PROCEDURE — G8427 DOCREV CUR MEDS BY ELIG CLIN: HCPCS | Performed by: SPECIALIST

## 2022-05-05 PROCEDURE — G8536 NO DOC ELDER MAL SCRN: HCPCS | Performed by: SPECIALIST

## 2022-05-05 PROCEDURE — 99214 OFFICE O/P EST MOD 30 MIN: CPT | Performed by: SPECIALIST

## 2022-05-05 PROCEDURE — 1101F PT FALLS ASSESS-DOCD LE1/YR: CPT | Performed by: SPECIALIST

## 2022-05-05 PROCEDURE — G8419 CALC BMI OUT NRM PARAM NOF/U: HCPCS | Performed by: SPECIALIST

## 2022-05-05 PROCEDURE — 3017F COLORECTAL CA SCREEN DOC REV: CPT | Performed by: SPECIALIST

## 2022-05-05 PROCEDURE — G8432 DEP SCR NOT DOC, RNG: HCPCS | Performed by: SPECIALIST

## 2022-05-05 RX ORDER — GUAIFENESIN 100 MG/5ML
81 LIQUID (ML) ORAL DAILY
Qty: 90 TABLET | Refills: 3
Start: 2022-05-05 | End: 2022-10-12

## 2022-05-05 NOTE — PROGRESS NOTES
Chief Complaint   Patient presents with    Annual Exam    Irregular Heart Beat     Visit Vitals  /80 (BP 1 Location: Left upper arm, BP Patient Position: Sitting)   Pulse 60   Ht 5' 11\" (1.803 m)   Wt 185 lb (83.9 kg)   SpO2 97%   BMI 25.80 kg/m²     Chest pain denied   SOB denied   Palpitations denied   Swelling in hands/feet denied   Dizziness denied   Recent hospital stays denied   Refills denied

## 2022-05-05 NOTE — PROGRESS NOTES
Chata Lundberg MD. [facility]              Patient: Fela Barkley  : 1955      Today's Date: 2022            HISTORY OF PRESENT ILLNESS:     History of Present Illness:  He feels well since AF ablation  -- no complaints -- no heart racing spells. No CP or SOB or bleeding. PAST MEDICAL HISTORY:     Past Medical History:   Diagnosis Date    Atrial fibrillation [facility])     ER visit on 12 - converted to NSR with Corvert;    Cardioverted in ED again 20. [facility] on 20    Bradycardia     Started seeing Dr. Samia Zimmer and Dr. Ramon Patrick for this years ago    GERD (gastroesophageal reflux disease)     [place]    Gout     Hypoglycemia     Mixed hyperlipidemia     Nausea & vomiting     PVC (premature ventricular contraction)          Past Surgical History:   Procedure Laterality Date    ECHO STRESS      normal resting LV wall motion and no evidence of ischemia. Gated EF (%): 60-65%    HX GI      Nissen fundiplication    HX GI      polypectomy from colon    HX HERNIA REPAIR      Left IHR, right IHR, hiatal hernia    HX HERNIA REPAIR      HX KNEE ARTHROSCOPY  1972    right    HX OTHER SURGICAL  ,     excision 7 lipomas, excision 3 lipomas,mole excision back    HX OTHER SURGICAL      Echo 12 - Normal    HX UROLOGICAL      epididydectomy left    LOOP MONITOR      no significant arrhythmias infrequent atrial extrasystoles and ventricular extrasystoles.      FL COMPRE EP EVAL ABLTJ ATR FIB PULM VEIN ISOLATION N/A 2020    ABLATION A-FIB  W COMPLETE EP STUDY performed by Laquita Spence MD at [place] [address], [facility] [address] CATH LAB    FL INTRACARD ECHO, THER/DX INTERVENT N/A 2020    Intracardiac Echocardiogram performed by Laquita Spence MD at [place] [address], [address] CATH LAB    FL INTRACARDIAC ELECTROPHYSIOLOGIC 3D MAPPING N/A 2020    Ep 3d Mapping performed by Laquita Spence MD at [place] [address], [facility] [address] CATH LAB    FL TOTAL KNEE ARTHROPLASTY  2008    right MEDICATIONS:     Current Outpatient Medications   Medication Sig Dispense Refill    cholecalciferol (VITAMIN D3) (1000 Units /25 mcg) tablet Take 1,000 Units by mouth daily.  Eliquis 5 mg tablet TAKE 1 TABLET TWICE DAILY 180 Tablet 1    simvastatin (ZOCOR) 40 mg tablet TAKE 1 TABLET BY MOUTH EVERY DAY AT NIGHT 90 Tablet 3    TURMERIC PO Take 450 mg by mouth.  acetaminophen (TYLENOL) 500 mg tablet Take 1,000 mg by mouth every eight (8) hours as needed for Pain.  ascorbic acid, vitamin C, (Vitamin C) 500 mg tablet Take 500 mg by mouth two (2) times a day.  multivitamin, stress formula (STRESS TAB) tablet Take 1 Tab by mouth daily.  omega-3 fatty acids-vitamin e (FISH OIL) 1,000 mg Cap Take 1 Cap by mouth two (2) times a day. Allergies   Allergen Reactions    Vioxx [Rofecoxib] Other (comments)     Mouth ulcers    Zofran Odt [Ondansetron] Nausea Only             SOCIAL HISTORY:     Social History     Tobacco Use    Smoking status: Former Smoker     Packs/day: 1.00     Years: 12.00     Pack years: 12.00     Quit date: 3/31/1986     Years since quittin.1    Smokeless tobacco: Never Used   Substance Use Topics    Alcohol use: Yes     Alcohol/week: 10.0 standard drinks     Types: 10 Cans of beer per week    Drug use: No         FAMILY HISTORY:     Family History   Problem Relation Age of Onset    Arrhythmia Father             REVIEW OF SYSTEMS:       Review of Systems:   Constitutional: Negative for fever, chills   HEENT: Negative for vision changes. Respiratory: Negative for cough   Cardiovascular: Negative for orthopnea, syncope, and PND. Gastrointestinal: Negative for abdominal pain, diarrhea, or melena   Genitourinary: Negative for dysuria   Musculoskeletal: Negative for myalgias. + joint pain   Skin: Negative for rash   Heme: No problems bleeding.    Neurological: Negative for speech change and focal weakness.                     PHYSICAL EXAM:      Physical Exam:  Visit Vitals  /80 (BP 1 Location: Left upper arm, BP Patient Position: Sitting)   Pulse 60   Ht 5' 11\" (1.803 m)   Wt 185 lb (83.9 kg)   SpO2 97%   BMI 25.80 kg/m²          Patient appears generally well, mood and affect are appropriate and pleasant. HEENT:  Hearing intact, non-icteric, normocephalic, atraumatic. Neck Exam: Supple, No JVD    Lung Exam: Clear to auscultation, even breath sounds. Cardiac Exam: Regular rate and rhythm with no murmur  Abdomen: Soft, non-tender, normal bowel sounds.    Extremities: Moves all ext well. No lower extremity edema. No major rashes  Good ROM Ext  Psych: Appropriate affect  Neuro - Grossly intact                 LABS / OTHER STUDIES:            Lab Results   Component Value Date/Time    Sodium 144 04/01/2022 08:43 AM    Potassium 4.3 04/01/2022 08:43 AM    Chloride 106 04/01/2022 08:43 AM    CO2 24 04/01/2022 08:43 AM    Anion gap 5 11/04/2020 07:50 AM    Glucose 98 04/01/2022 08:43 AM    BUN 15 04/01/2022 08:43 AM    Creatinine 1.09 04/01/2022 08:43 AM    BUN/Creatinine ratio 14 04/01/2022 08:43 AM    GFR est AA 84 12/14/2020 09:07 AM    GFR est non-AA 73 12/14/2020 09:07 AM    Calcium 10.3 (H) 04/01/2022 08:43 AM    Bilirubin, total 0.9 09/29/2020 12:00 AM    Alk.  phosphatase 74 09/29/2020 12:00 AM    Protein, total 7.0 09/29/2020 12:00 AM    Albumin 4.7 09/29/2020 12:00 AM    Globulin 2.8 11/10/2013 10:35 PM    A-G Ratio 2.0 09/29/2020 12:00 AM    ALT (SGPT) 24 09/29/2020 12:00 AM    AST (SGOT) 21 09/29/2020 12:00 AM     Lab Results   Component Value Date/Time    WBC 4.9 04/01/2022 08:43 AM    Hemoglobin (POC) 15.3 02/16/2012 03:09 PM    HGB 15.6 04/01/2022 08:43 AM    Hematocrit (POC) 45 02/16/2012 03:09 PM    HCT 47.5 04/01/2022 08:43 AM    PLATELET 121 87/17/3406 08:43 AM    MCV 93 04/01/2022 08:43 AM     Lab Results   Component Value Date/Time    Cholesterol, total 153 04/01/2022 08:43 AM    HDL Cholesterol 62 04/01/2022 08:43 AM    LDL, calculated 80 04/01/2022 08:43 AM    LDL, calculated 76 10/02/2013 12:00 AM    VLDL, calculated 11 04/01/2022 08:43 AM    VLDL, calculated 14 10/02/2013 12:00 AM    Triglyceride 52 04/01/2022 08:43 AM     Labs 10/5/221 - CMP OK, chol 159, TG 86, LDL 71, HDL 72, TSH 2,             CARDIAC DIAGNOSTICS:       Cardiac Evaluation Includes:  Treadmill stress test 12/2/13 - walked 10:04 (11.8 METS). Sinus bradycardia at rest. Stress  bpm (94% MPHR). No ischemic EKG changes. Frequent PVC's in exercise and recovery (asymptomatic). Echo 12/2/13 - LVEF 60-65%. Mild LAE.      Echo 10/26/20 - LVEF 56%, mild LAE     Exercise Cardiolite 10/26/20 - walked 10 min (13.4 METS), No CP or ischemic EKG changes.  Frequent PVC's during exercise (PVC\"s in bigeminy at times).  Normal MPI.  LVEF 55% --- He had similar PVC's with exercise in 2013.        AF Ablation 12/22/20             EKG 2/19/20 - sinus janny, HR 47   EKG 9/28/20 - Afib,   EKG 11/4/20 - Afib, HR 95   EKG 11/4/20  - sinus janny, HR 44 -- after [facility]   EKG 1/12/21 - NSR, PAC   EKG 10/4/21 - sinus janny, HR 53             ASSESSMENT AND PLAN:       Assessment and Plan:     1) Paroxysmal Afib   - First Afib 2/12 (received Corevert then);     - Cardioverted for Afib 9/28/20   - Cardioverted again for Afib 11/4/20   - Had AF ablation 12/22/20  --> No AF problems since then   - His [id number] score is 1 -- EP has offered option of ASA going forward instead of [facility] ----> patient is leaning towards proceeding with ASA 81 mg daily       2) Chronic resting bradycardia   - asymptomatic   - Event monitor in 2007 unremarkable   - Has normal HR response to exercise during stress tests   - He is active without complaints      3) Dyslipidemia   - Cont simvastatin - lipids followed by PCP - recent lipids OK     4) High calcium chronically - asked him to see PCP for this     5) See me in 12 months. 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    He has a rescue dog. Zena Randall cruising.              Bigg Smith MD, [address], [address]      [address]  [address], [facility] [address]  Ph: [phone]                               Ph [phone]

## 2022-05-17 RX ORDER — SIMVASTATIN 40 MG/1
TABLET, FILM COATED ORAL
Qty: 90 TABLET | Refills: 3 | Status: SHIPPED | OUTPATIENT
Start: 2022-05-17

## 2022-10-08 ENCOUNTER — HOSPITAL ENCOUNTER (EMERGENCY)
Age: 67
Discharge: HOME | End: 2022-10-08
Attending: EMERGENCY MEDICINE
Payer: MEDICARE

## 2022-10-08 VITALS
HEIGHT: 71 IN | SYSTOLIC BLOOD PRESSURE: 115 MMHG | RESPIRATION RATE: 20 BRPM | OXYGEN SATURATION: 97 % | WEIGHT: 180 LBS | HEART RATE: 54 BPM | TEMPERATURE: 98 F | BODY MASS INDEX: 25.2 KG/M2 | DIASTOLIC BLOOD PRESSURE: 60 MMHG

## 2022-10-08 DIAGNOSIS — I48.0 PAROXYSMAL ATRIAL FIBRILLATION (HCC): Primary | ICD-10-CM

## 2022-10-08 LAB
ALBUMIN SERPL-MCNC: 3.8 G/DL (ref 3.5–5)
ALBUMIN/GLOB SERPL: 1.4 {RATIO} (ref 1.1–2.2)
ALP SERPL-CCNC: 71 U/L (ref 45–117)
ALT SERPL-CCNC: 32 U/L (ref 12–78)
ANION GAP SERPL CALC-SCNC: 3 MMOL/L (ref 5–15)
AST SERPL-CCNC: 25 U/L (ref 15–37)
BASOPHILS # BLD: 0.1 K/UL (ref 0–0.1)
BASOPHILS NFR BLD: 1 % (ref 0–1)
BILIRUB SERPL-MCNC: 0.6 MG/DL (ref 0.2–1)
BNP SERPL-MCNC: 547 PG/ML
BUN SERPL-MCNC: 14 MG/DL (ref 6–20)
BUN/CREAT SERPL: 13 (ref 12–20)
CALCIUM SERPL-MCNC: 9.1 MG/DL (ref 8.5–10.1)
CHLORIDE SERPL-SCNC: 109 MMOL/L (ref 97–108)
CO2 SERPL-SCNC: 30 MMOL/L (ref 21–32)
COMMENT, HOLDF: NORMAL
CREAT SERPL-MCNC: 1.1 MG/DL (ref 0.7–1.3)
DIFFERENTIAL METHOD BLD: NORMAL
EOSINOPHIL # BLD: 0.1 K/UL (ref 0–0.4)
EOSINOPHIL NFR BLD: 3 % (ref 0–7)
ERYTHROCYTE [DISTWIDTH] IN BLOOD BY AUTOMATED COUNT: 12.6 % (ref 11.5–14.5)
GLOBULIN SER CALC-MCNC: 2.8 G/DL (ref 2–4)
GLUCOSE SERPL-MCNC: 78 MG/DL (ref 65–100)
HCT VFR BLD AUTO: 46 % (ref 36.6–50.3)
HGB BLD-MCNC: 15.3 G/DL (ref 12.1–17)
IMM GRANULOCYTES # BLD AUTO: 0 K/UL (ref 0–0.04)
IMM GRANULOCYTES NFR BLD AUTO: 0 % (ref 0–0.5)
LYMPHOCYTES # BLD: 1.9 K/UL (ref 0.8–3.5)
LYMPHOCYTES NFR BLD: 35 % (ref 12–49)
MAGNESIUM SERPL-MCNC: 2 MG/DL (ref 1.6–2.4)
MCH RBC QN AUTO: 31.6 PG (ref 26–34)
MCHC RBC AUTO-ENTMCNC: 33.3 G/DL (ref 30–36.5)
MCV RBC AUTO: 95 FL (ref 80–99)
MONOCYTES # BLD: 0.5 K/UL (ref 0–1)
MONOCYTES NFR BLD: 9 % (ref 5–13)
NEUTS SEG # BLD: 2.9 K/UL (ref 1.8–8)
NEUTS SEG NFR BLD: 52 % (ref 32–75)
NRBC # BLD: 0 K/UL (ref 0–0.01)
NRBC BLD-RTO: 0 PER 100 WBC
PLATELET # BLD AUTO: 229 K/UL (ref 150–400)
PMV BLD AUTO: 10.3 FL (ref 8.9–12.9)
POTASSIUM SERPL-SCNC: 4.5 MMOL/L (ref 3.5–5.1)
PROT SERPL-MCNC: 6.6 G/DL (ref 6.4–8.2)
RBC # BLD AUTO: 4.84 M/UL (ref 4.1–5.7)
SAMPLES BEING HELD,HOLD: NORMAL
SODIUM SERPL-SCNC: 142 MMOL/L (ref 136–145)
TROPONIN-HIGH SENSITIVITY: 10 NG/L (ref 0–76)
WBC # BLD AUTO: 5.6 K/UL (ref 4.1–11.1)

## 2022-10-08 PROCEDURE — 99284 EMERGENCY DEPT VISIT MOD MDM: CPT

## 2022-10-08 PROCEDURE — 84484 ASSAY OF TROPONIN QUANT: CPT

## 2022-10-08 PROCEDURE — 36415 COLL VENOUS BLD VENIPUNCTURE: CPT

## 2022-10-08 PROCEDURE — 96374 THER/PROPH/DIAG INJ IV PUSH: CPT

## 2022-10-08 PROCEDURE — 83735 ASSAY OF MAGNESIUM: CPT

## 2022-10-08 PROCEDURE — 74011250637 HC RX REV CODE- 250/637: Performed by: EMERGENCY MEDICINE

## 2022-10-08 PROCEDURE — 99152 MOD SED SAME PHYS/QHP 5/>YRS: CPT

## 2022-10-08 PROCEDURE — 80053 COMPREHEN METABOLIC PANEL: CPT

## 2022-10-08 PROCEDURE — 85025 COMPLETE CBC W/AUTO DIFF WBC: CPT

## 2022-10-08 PROCEDURE — 92960 CARDIOVERSION ELECTRIC EXT: CPT

## 2022-10-08 PROCEDURE — 93005 ELECTROCARDIOGRAM TRACING: CPT

## 2022-10-08 PROCEDURE — 74011000250 HC RX REV CODE- 250: Performed by: EMERGENCY MEDICINE

## 2022-10-08 PROCEDURE — 83880 ASSAY OF NATRIURETIC PEPTIDE: CPT

## 2022-10-08 RX ORDER — ETOMIDATE 2 MG/ML
0.1 INJECTION INTRAVENOUS AS NEEDED
Status: COMPLETED | OUTPATIENT
Start: 2022-10-08 | End: 2022-10-08

## 2022-10-08 RX ADMIN — ETOMIDATE 8.16 MG: 2 INJECTION, SOLUTION INTRAVENOUS at 12:16

## 2022-10-08 RX ADMIN — APIXABAN 5 MG: 5 TABLET, FILM COATED ORAL at 12:44

## 2022-10-08 NOTE — ED PROVIDER NOTES
Naomi Chance is a 79 yo M with history of paroxysmal atrial fibrillation ablated on December 2020 who presents to the ED with palpitations. He states that he first noted this morning when he was out walking his dog. He denies chest pain or shortness of breath. He had noticed swelling in his bilateral ankles earlier this week. He no longer is on anticoagulation since he had not experienced a-fib since his ablation. Past Medical History:   Diagnosis Date    Atrial fibrillation [facility])     ER visit on 2/16/12 - converted to NSR with Corvert;    Cardioverted in ED again 9/28/20. 2nd [facility] on 11/4/20    Bradycardia     Started seeing Dr. Randy Belcher and Dr. Sergio Veliz for this years ago    GERD (gastroesophageal reflux disease) 1998    Chano    Gout     Hypoglycemia     Mixed hyperlipidemia     Nausea & vomiting     PVC (premature ventricular contraction)        Past Surgical History:   Procedure Laterality Date    ECHO STRESS  2007    normal resting LV wall motion and no evidence of ischemia. Gated EF (%): 60-65%    HX GI  1998    Nissen fundiplication    HX GI      polypectomy from colon    HX HERNIA REPAIR      Left IHR, right IHR, hiatal hernia    HX HERNIA REPAIR      HX KNEE ARTHROSCOPY  1972    right    HX OTHER SURGICAL  1989, 2005    excision 7 lipomas, excision 3 lipomas,mole excision back    HX OTHER SURGICAL      Echo 2/17/12 - Normal    HX UROLOGICAL      epididydectomy left    LOOP MONITOR  2007    no significant arrhythmias infrequent atrial extrasystoles and ventricular extrasystoles.      [place] COMPRE EP EVAL ABLTJ ATR FIB PULM VEIN ISOLATION N/A 12/22/2020    ABLATION A-FIB  W COMPLETE EP STUDY [facility] CATH LAB    WA INTRACARD ECHO, THER/DX INTERVENT N/A 12/22/2020    Intracardiac Echocardiogram performed by Iesah Ranikn MD at [facility] [address], [address] CATH LAB    WA INTRACARDIAC ELECTROPHYSIOLOGIC 3D MAPPING N/A 12/22/2020    Ep 3d Mapping performed by Iesha Rankin MD at [facility] CARDIAC CATH LAB    WA TOTAL KNEE ARTHROPLASTY  2008    right         Family History:   Problem Relation Age of Onset    Arrhythmia Father        Social History     Socioeconomic History    Marital status:      Spouse name: Not on file    Number of children: Not on file    Years of education: Not on file    Highest education level: Not on file   Occupational History    Not on file   Tobacco Use    Smoking status: Former     Packs/day: 1.00     Years: 12.00     Pack years: 12.00     Types: Cigarettes     Quit date: 3/31/1986     Years since quittin.5    Smokeless tobacco: Never   Substance and Sexual Activity    Alcohol use: Yes     Alcohol/week: 10.0 standard drinks     Types: 10 Cans of beer per week    Drug use: No    Sexual activity: Yes     Partners: Female   Other Topics Concern    Not on file   Social History Narrative    Not on file     Social Determinants of Health     Financial Resource Strain: Not on file   Food Insecurity: Not on file   Transportation Needs: Not on file   Physical Activity: Not on file   Stress: Not on file   Social Connections: Not on file   Intimate Partner Violence: Not on file   Housing Stability: Not on file         ALLERGIES: Vioxx [rofecoxib] and Zofran odt [ondansetron]    Review of Systems   Constitutional:  Negative for fever. HENT:  Negative for sore throat. Eyes:  Negative for visual disturbance. Respiratory:  Negative for cough. Cardiovascular:  Positive for palpitations. Negative for chest pain. Gastrointestinal:  Negative for abdominal pain. Genitourinary:  Negative for dysuria. Musculoskeletal:  Negative for back pain. Skin:  Negative for rash. Neurological:  Negative for headaches. Vitals:    10/08/22 1006 10/08/22 1010   BP: 119/89    Pulse: (!) 105 (!) 109   Resp: 16    Temp: 97.8 °F (36.6 °C)    SpO2: 99%    Weight: 81.6 kg (180 lb)    Height: 5' 11\" (1.803 m)             Physical Exam  Vitals and nursing note reviewed. Constitutional:       General: He is not in acute distress. Appearance: He is well-developed. HENT:      Head: Normocephalic and atraumatic. Mouth/Throat:      Mouth: Mucous membranes are moist.   Eyes:      Extraocular Movements: Extraocular movements intact. Conjunctiva/sclera: Conjunctivae normal.   Neck:      Trachea: Phonation normal.   Cardiovascular:      Rate and Rhythm: Normal rate. Rhythm irregular. Heart sounds: No friction rub. Pulmonary:      Effort: Pulmonary effort is normal. No respiratory distress. Breath sounds: No wheezing or rales. Abdominal:      General: There is no distension. Musculoskeletal:         General: No tenderness. Normal range of motion. Cervical back: Normal range of motion. Skin:     General: Skin is warm and dry. Neurological:      General: No focal deficit present. Mental Status: He is alert. He is not disoriented. Motor: No abnormal muscle tone. ED EKG interpretation:  Rhythm: atrial fib; and irregular. Rate (approx.): 108; Axis: normal; P wave: fibrillation; QRS interval: normal ; ST/T wave: non-specific changes; Other findings: abnormal ekg. This EKG was interpreted by Chuck Greenfield MD,ED Provider. ED EKG interpretation:  Rhythm: sinus bradycardia and PAC's; and regular . Rate (approx.): 52; Axis: normal; P wave: normal; QRS interval: normal ; ST/T wave: normal; Other findings: otherwise normal. This EKG was interpreted by Chuck Greenfield MD,ED Provider. MDM         Discussed with Dr. Albert Lunch - agrees with plan for cardioversion either with ibutilide or electrocardioversion as patient desires cardioversion today and we are confident in his onset of symptoms. Recommends starting eliquis regardless and prescribed metoprolol 12.5mg BID if converts.       Procedural Sedation    Date/Time: 10/8/2022 12:31 PM  Performed by: Carolina Dumont MD  Authorized by: Carolina Dumont MD     Consent: Consent obtained:  Written    Consent given by:  Patient    Risks, benefits, and alternatives were discussed: yes      Risks discussed:   Allergic reaction, dysrhythmia, inadequate sedation, nausea, vomiting, prolonged hypoxia resulting in organ damage and respiratory compromise necessitating ventilatory assistance and intubation  Universal protocol:     Patient identity confirmed:  Verbally with patient and arm band  Indications:     Procedure performed:  Cardioversion    Procedure necessitating sedation performed by:  Physician performing sedation    Intended level of sedation:  Moderate  Pre-sedation assessment:     Time since last food or drink:  4h    NPO status caution: urgency dictates proceeding with non-ideal NPO status      ASA classification: class 2 - patient with mild systemic disease      Mouth opening:  3 or more finger widths    Thyromental distance:  4 finger widths    Mallampati score:  II - soft palate, uvula, fauces visible    Neck mobility: normal      Pre-sedation assessments completed and reviewed: airway patency, anesthesia/sedation history, cardiovascular function, hydration status, mental status, nausea/vomiting and respiratory function      History of difficult intubation: no      Pre-sedation assessment completed:  10/8/2022 12:00 PM  Immediate pre-procedure details:     Reviewed: vital signs and NPO status    Procedure details (see MAR for exact dosages):     Sedation start time:  10/8/2022 12:15 PM    Preoxygenation:  Room air    Sedation:  Etomidate    Intra-procedure monitoring:  Blood pressure monitoring, cardiac monitor, frequent LOC assessments, frequent vital sign checks, continuous capnometry and continuous pulse oximetry    Intra-procedure events: none      Sedation end time:  10/8/2022 12:25 PM    Total sedation time (minutes):  10  Post-procedure details:     Post-sedation assessment completed:  10/8/2022 12:55 PM    Attendance: Constant attendance by certified staff until patient recovered      Recovery: Patient returned to pre-procedure baseline      Post-sedation assessments completed and reviewed: airway patency, cardiovascular function, hydration status, mental status, nausea/vomiting, pain level and respiratory function      Patient is stable for discharge or admission: yes      Procedure completion:  Tolerated well, no immediate complications  Cardioversion, electrical    Date/Time: 10/8/2022 12:32 PM  Performed by: Naina Ramirez MD  Authorized by: Naina Ramirez MD     Consent:     Consent obtained:  Verbal and written    Consent given by:  Patient    Risks, benefits, and alternatives were discussed: yes      Risks discussed:  Cutaneous burn, induced arrhythmia, pain and death    Alternatives discussed:  Rate-control medication and anti-coagulation medication  Universal protocol:     Patient identity confirmed:  Verbally with patient and arm band  Pre-procedure details:     Cardioversion basis:  Elective    Rhythm:  Atrial fibrillation    Electrode placement:  Anterior-posterior  Attempt one:     Cardioversion mode:  Synchronous    Waveform:  Biphasic    Shock (Joules):  100    Shock outcome:  Conversion to normal sinus rhythm  Post-procedure details:     Patient status:  Awake    Procedure completion:  Tolerated well, no immediate complications      19:69 PM  Patient remains in sinus bradycardia HR 51. Discussed with Dr. Leighton Bazan, agrees to not start metoprolol. Will discharge home with eliquis. Patient to follow up with Dr. Dara Miguel.

## 2022-10-08 NOTE — ED NOTES
Pt placed on defib pads, code cart in room, capnography, continuous BP, cardiac monitoring, ambu bag, suction

## 2022-10-08 NOTE — ED TRIAGE NOTES
Pt reports feeling like he converted to afib around 6-7am this morning with hx. Pt reports feeling SOB denies chest pain. Denies blood thinners at home, and is not prescribed current medications for afib. Reports bilateral lower leg edema earlier this week.

## 2022-10-10 LAB
ATRIAL RATE: 52 BPM
CALCULATED P AXIS, ECG09: 66 DEGREES
CALCULATED R AXIS, ECG10: 17 DEGREES
CALCULATED T AXIS, ECG11: 1 DEGREES
DIAGNOSIS, 93000: NORMAL
P-R INTERVAL, ECG05: 166 MS
Q-T INTERVAL, ECG07: 394 MS
QRS DURATION, ECG06: 82 MS
QTC CALCULATION (BEZET), ECG08: 366 MS
VENTRICULAR RATE, ECG03: 52 BPM

## 2022-10-11 LAB
ATRIAL RATE: 66 BPM
CALCULATED R AXIS, ECG10: 52 DEGREES
CALCULATED T AXIS, ECG11: -41 DEGREES
DIAGNOSIS, 93000: NORMAL
Q-T INTERVAL, ECG07: 304 MS
QRS DURATION, ECG06: 76 MS
QTC CALCULATION (BEZET), ECG08: 407 MS
VENTRICULAR RATE, ECG03: 108 BPM

## 2022-10-12 ENCOUNTER — OFFICE VISIT (OUTPATIENT)
Dept: CARDIOLOGY CLINIC | Age: 67
End: 2022-10-12
Payer: MEDICARE

## 2022-10-12 VITALS
HEIGHT: 71 IN | WEIGHT: 179 LBS | HEART RATE: 64 BPM | OXYGEN SATURATION: 97 % | DIASTOLIC BLOOD PRESSURE: 68 MMHG | SYSTOLIC BLOOD PRESSURE: 122 MMHG | BODY MASS INDEX: 25.06 KG/M2

## 2022-10-12 DIAGNOSIS — E78.2 MIXED HYPERLIPIDEMIA: ICD-10-CM

## 2022-10-12 DIAGNOSIS — I48.0 PAROXYSMAL ATRIAL FIBRILLATION (HCC): Primary | ICD-10-CM

## 2022-10-12 PROCEDURE — G8536 NO DOC ELDER MAL SCRN: HCPCS | Performed by: SPECIALIST

## 2022-10-12 PROCEDURE — 3017F COLORECTAL CA SCREEN DOC REV: CPT | Performed by: SPECIALIST

## 2022-10-12 PROCEDURE — 1123F ACP DISCUSS/DSCN MKR DOCD: CPT | Performed by: SPECIALIST

## 2022-10-12 PROCEDURE — G8420 CALC BMI NORM PARAMETERS: HCPCS | Performed by: SPECIALIST

## 2022-10-12 PROCEDURE — 1101F PT FALLS ASSESS-DOCD LE1/YR: CPT | Performed by: SPECIALIST

## 2022-10-12 PROCEDURE — G8427 DOCREV CUR MEDS BY ELIG CLIN: HCPCS | Performed by: SPECIALIST

## 2022-10-12 PROCEDURE — G8432 DEP SCR NOT DOC, RNG: HCPCS | Performed by: SPECIALIST

## 2022-10-12 PROCEDURE — 93000 ELECTROCARDIOGRAM COMPLETE: CPT | Performed by: SPECIALIST

## 2022-10-12 PROCEDURE — 99214 OFFICE O/P EST MOD 30 MIN: CPT | Performed by: SPECIALIST

## 2022-10-12 NOTE — PROGRESS NOTES
Chief Complaint   Patient presents with    Follow-up     Hosp f/up 10/8 ED afib    Irregular Heart Beat     paf     Vitals:    10/12/22 1535   BP: 122/68   BP 1 Location: Right upper arm   BP Patient Position: Sitting   BP Cuff Size: Large adult   Pulse: 64   Height: 5' 11\" (1.803 m)   Weight: 179 lb (81.2 kg)   SpO2: 97%     Chest pain denied   SOB denied   Palpitations denied   Swelling in hands/feet denied   Dizziness denied   Recent hospital stays ED 10/8 for PAF  Refills denied     Labs drawn today with Dr. Constantino Varghese.

## 2022-10-12 NOTE — PROGRESS NOTES
Ruben Dasilva MD. [facility]              Patient: Deidre Silva  : 1955      Today's Date: 10/12/2022            HISTORY OF PRESENT ILLNESS:     History of Present Illness:  Seen in ED on 10/8/22 with Afib --> [facility] - fine since then - sent home on eliquis             PAST MEDICAL HISTORY:     Past Medical History:   Diagnosis Date    Atrial fibrillation [facility])     ER visit on 12 - converted to NSR with Corvert;    Cardioverted in ED again 20. [facility]nd [facility] on [facility]    Bradycardia     Started seeing Dr. Jacqui Hagan and Dr. Zoë Ayala for this years ago    GERD (gastroesophageal reflux disease)     Chano    Gout     Hypoglycemia     Mixed hyperlipidemia     Nausea & vomiting     PVC (premature ventricular contraction)          Past Surgical History:   Procedure Laterality Date    ECHO STRESS      normal resting LV wall motion and no evidence of ischemia. Gated EF (%): 60-65%    HX GI      Nissen fundiplication    HX GI      polypectomy from colon    HX HERNIA REPAIR      Left IHR, right IHR, hiatal hernia    HX HERNIA REPAIR      HX KNEE ARTHROSCOPY  1972    right    HX OTHER SURGICAL  ,     excision 7 lipomas, excision 3 lipomas,mole excision back    HX OTHER SURGICAL      Echo 12 - Normal    HX UROLOGICAL      epididydectomy left    LOOP MONITOR      no significant arrhythmias infrequent atrial extrasystoles and ventricular extrasystoles.      AK COMPRE EP EVAL ABLTJ ATR FIB PULM VEIN ISOLATION N/A 2020    ABLATION A-FIB  W COMPLETE EP STUDY performed by Mohit Pinon MD at [facility] [place] [address], [address] CATH LAB    AK INTRACARD ECHO, THER/DX INTERVENT N/A 2020    Intracardiac Echocardiogram performed by Mohit Pinon MD at [facility] [place] [address], [address] CATH LAB    AK INTRACARDIAC ELECTROPHYSIOLOGIC 3D MAPPING N/A 2020    Ep 3d Mapping performed by Mohit Pinon MD at [place] [address], [address] CATH LAB    AK TOTAL KNEE ARTHROPLASTY      right           MEDICATIONS:     Current Outpatient Medications   Medication Sig Dispense Refill    apixaban (Eliquis) 5 mg tablet Take 1 Tablet by mouth two (2) times a day. 60 Tablet 0    simvastatin (ZOCOR) 40 mg tablet TAKE 1 TABLET BY MOUTH EVERY DAY AT NIGHT 90 Tablet 3    cholecalciferol (VITAMIN D3) (1000 Units /25 mcg) tablet Take 1,000 Units by mouth daily. TURMERIC PO Take 450 mg by mouth. acetaminophen (TYLENOL) 500 mg tablet Take 1,000 mg by mouth every eight (8) hours as needed for Pain. ascorbic acid, vitamin C, (VITAMIN C) 500 mg tablet Take 500 mg by mouth two (2) times a day. multivitamin, stress formula (STRESS TAB) tablet Take 1 Tab by mouth daily. omega-3 fatty acids-vitamin e 1,000 mg cap Take 1 Cap by mouth two (2) times a day. aspirin 81 mg chewable tablet Take 1 Tablet by mouth daily. 90 Tablet 3       Allergies   Allergen Reactions    Vioxx [Rofecoxib] Other (comments)     Mouth ulcers    Zofran Odt [Ondansetron] Nausea Only             SOCIAL HISTORY:     Social History     Tobacco Use    Smoking status: Former     Packs/day: 1.00     Years: 12.00     Pack years: 12.00     Types: Cigarettes     Quit date: 3/31/1986     Years since quittin.5    Smokeless tobacco: Never   Substance Use Topics    Alcohol use: Yes     Alcohol/week: 10.0 standard drinks     Types: 10 Cans of beer per week    Drug use: No         FAMILY HISTORY:     Family History   Problem Relation Age of Onset    Arrhythmia Father             REVIEW OF SYSTEMS:       Review of Systems:   Constitutional: Negative for fever, chills   HEENT: Negative for vision changes. Respiratory: Negative for cough   Cardiovascular: Negative for orthopnea, syncope, and PND. Gastrointestinal: Negative for abdominal pain, diarrhea, or melena   Genitourinary: Negative for dysuria   Musculoskeletal: Negative for myalgias. + joint pain   Skin: Negative for rash   Heme: No problems bleeding. Neurological: Negative for speech change and focal weakness. PHYSICAL EXAM:      Physical Exam:  Visit Vitals  /68 (BP 1 Location: Right upper arm, BP Patient Position: Sitting, BP Cuff Size: Large adult)   Pulse 64   Ht 5' 11\" (1.803 m)   Wt 179 lb (81.2 kg)   SpO2 97%   BMI 24.97 kg/m²          Patient appears generally well, mood and affect are appropriate and pleasant. HEENT:  Hearing intact, non-icteric, normocephalic, atraumatic. Neck Exam: Supple, No JVD    Lung Exam: Clear to auscultation, even breath sounds. Cardiac Exam: Regular rate and rhythm with no murmur  Abdomen: Soft, non-tender, normal bowel sounds. Extremities: Moves all ext well. No lower extremity edema. No major rashes  Good ROM Ext  Psych: Appropriate affect  Neuro - Grossly intact                 LABS / OTHER STUDIES:            Lab Results   Component Value Date/Time    Sodium 142 10/08/2022 10:24 AM    Potassium 4.5 10/08/2022 10:24 AM    Chloride 109 (H) 10/08/2022 10:24 AM    CO2 30 10/08/2022 10:24 AM    Anion gap 3 (L) 10/08/2022 10:24 AM    Glucose 78 10/08/2022 10:24 AM    BUN 14 10/08/2022 10:24 AM    Creatinine 1.10 10/08/2022 10:24 AM    BUN/Creatinine ratio 13 10/08/2022 10:24 AM    GFR est AA 84 12/14/2020 09:07 AM    GFR est non-AA 73 12/14/2020 09:07 AM    Calcium 9.1 10/08/2022 10:24 AM    Bilirubin, total 0.6 10/08/2022 10:24 AM    Alk.  phosphatase 71 10/08/2022 10:24 AM    Protein, total 6.6 10/08/2022 10:24 AM    Albumin 3.8 10/08/2022 10:24 AM    Globulin 2.8 10/08/2022 10:24 AM    A-G Ratio 1.4 10/08/2022 10:24 AM    ALT (SGPT) 32 10/08/2022 10:24 AM    AST (SGOT) 25 10/08/2022 10:24 AM     Lab Results   Component Value Date/Time    WBC 5.6 10/08/2022 10:24 AM    Hemoglobin (POC) 15.3 02/16/2012 03:09 PM    HGB 15.3 10/08/2022 10:24 AM    Hematocrit (POC) 45 02/16/2012 03:09 PM    HCT 46.0 10/08/2022 10:24 AM    PLATELET 622 76/99/2102 10:24 AM    MCV 95.0 10/08/2022 10:24 AM     Lab Results   Component Value Date/Time    Cholesterol, total 153 04/01/2022 08:43 AM    HDL Cholesterol 62 04/01/2022 08:43 AM    LDL, calculated 80 04/01/2022 08:43 AM    LDL, calculated 76 10/02/2013 12:00 AM    VLDL, calculated 11 04/01/2022 08:43 AM    VLDL, calculated 14 10/02/2013 12:00 AM    Triglyceride 52 04/01/2022 08:43 AM     Labs 10/5/221 - CMP OK, chol 159, TG 86, LDL 71, HDL 72, TSH 2,             CARDIAC DIAGNOSTICS:       Cardiac Evaluation Includes:  Treadmill stress test 12/2/13 - walked 10:04 (11.8 METS). Sinus bradycardia at rest. Stress  bpm (94% MPHR). No ischemic EKG changes. Frequent PVC's in exercise and recovery (asymptomatic). Echo 12/2/13 - LVEF 60-65%. Mild LAE. Echo 10/26/20 - LVEF 56%, mild LAE     Exercise Cardiolite 10/26/20 - walked 10 min (13.4 METS), No CP or ischemic EKG changes. Frequent PVC's during exercise (PVC\"s in bigeminy at times). Normal MPI. LVEF 55% --- He had similar PVC's with exercise in 2013.         AF Ablation 12/22/20               EKG 2/19/20 - sinus janny, HR 47   EKG 9/28/20 - Afib,   EKG 11/4/20 - Afib, HR 95   EKG 11/4/20  - sinus janny, HR 44 -- after [facility]   EKG 1/12/21 - NSR, PAC   EKG 10/4/21 - sinus janny, HR 53   EKG 10/8/22 #1 - Afib with RVR, 108 bpm   EKG 10/8/22 #2 - sinus janny             ASSESSMENT AND PLAN:       Assessment and Plan:     1) Paroxysmal Afib   - First Afib 2/12 (received Corevert then);     - Cardioverted for Afib 9/28/20   - Cardioverted again for Afib 11/4/20   - Had AF ablation 12/22/20   - Seen in ED on 10/8/22 for Afib with RVR --> was cardioverted in ED  ---> Will refer to Dr. Shu Monae for further Afib management  (low resting HR limits what meds we can use -- he prefers to wait and see EP before trying meds)   - His [id number] score is 1 -- EP offered option of ASA going forward instead of [address]  - He should continue eliquis at least 1 month s/p [facility] and then can stop if he prefers       2) Chronic resting bradycardia   - asymptomatic   - Event monitor in 2007 unremarkable   - Has normal HR response to exercise during stress tests   - He is active without complaints      3) Dyslipidemia   - Cont simvastatin - lipids followed by PCP - recent lipids OK     4) High calcium chronically - asked him to see PCP for this     5) See Dr. Edwar De Santiago when possible. 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 Enjoys cruising. Ben Adkins MD, [address]  [address] [place] [address], [address]      [place] [address]  [address]  [address]  Ph: [phone]                               Ph [phone]

## 2022-11-22 PROBLEM — Z79.01 ANTICOAGULATION ADEQUATE: Status: ACTIVE | Noted: 2022-11-22

## 2022-11-22 NOTE — PROGRESS NOTES
Cardiac Electrophysiology OFFICE Consultation Note       Assessment/Plan:   1. Paroxysmal atrial fibrillation (HCC)  2. Anticoagulation adequate  3. Mixed hyperlipidemia     Small atrial fibrillation  Symptomatic paroxysmal atrial fibrillation status post prior A. fib ablation on 12/22/2020 with Dr. Rina Marinelli. He has had multiple prior cardioversions. Has been doing well until recently on 10/8/2022 had recurrent persistent atrial fibrillation with associated dyspnea on exertion. Underwent cardioversion. Bradycardia limits long-term antiarrhythmic therapy or even yonathan blocking agents. He would like to remain off medicines. He is considering repeat ablation but will reassess for any recurrent atrial fibrillation.  - The implication regarding the diagnosis of AF was explained to the patient in great detail including the associated risk of CVA, AF-mediated cardiomyopathy, and progression into persistent/chronic AF. - Spoke to patient regarding the potential role repeat ablation given his inability to tolerate any yonathan or antiarrhythmic medications. If he has any further recurrent atrial fibrillation bouts, this would be the next step  - Continue Eliquis 5 mg twice daily for thromboembolic prophylaxis  - Follow-up with me in 6 months    Anticoagulation  Denies of any bleeding issues. Know to contact clinic with any bleeding side effects (BRBPR, melena, hemotysis, hematuria). Continue Eliquis 5 mg twice daily    Hyperlipidemia  Continue simvastatin 40 mg daily    Subjective:       Roberto Ma is a 78 y.o. patient who is seen for evaluation of  Atrial Fibrillation. Prior Afib ablation in 12/22/20. Multiple prior DCCVs, last on 10/8/22. Low HR limits medications. Last episode of Afib he had significant ROSENBERG. He otherwise is fairly active without functional limitations. No bleeding issues on Eliquis. Last EKG demonstrated sinus bradycardia with heart rate in the 50s.     Patient Active Problem List Diagnosis Code    Mixed hyperlipidemia E78.2    PVC (premature ventricular contraction) I49.3    Paroxysmal atrial fibrillation (HCC) I48.0    Anticoagulation adequate Z79.01     Current Outpatient Medications   Medication Sig Dispense Refill    apixaban (Eliquis) 5 mg tablet Take 1 Tablet by mouth two (2) times a day. 180 Tablet 3    simvastatin (ZOCOR) 40 mg tablet TAKE 1 TABLET BY MOUTH EVERY DAY AT NIGHT 90 Tablet 3    cholecalciferol (VITAMIN D3) (1000 Units /25 mcg) tablet Take 1,000 Units by mouth daily. TURMERIC PO Take 450 mg by mouth. acetaminophen (TYLENOL) 500 mg tablet Take 1,000 mg by mouth every eight (8) hours as needed for Pain. ascorbic acid, vitamin C, (VITAMIN C) 500 mg tablet Take 500 mg by mouth two (2) times a day. multivitamin, stress formula (STRESS TAB) tablet Take 1 Tab by mouth daily. omega-3 fatty acids-vitamin e 1,000 mg cap Take 1 Cap by mouth two (2) times a day. Allergies   Allergen Reactions    Vioxx [Rofecoxib] Other (comments)     Mouth ulcers    Zofran Odt [Ondansetron] Nausea Only     Past Medical History:   Diagnosis Date    Atrial fibrillation [facility])     ER visit on 2/16/12 - converted to NSR with Corvert;    Cardioverted in ED again 9/28/20. 2nd [facility] on 11/4/20    Bradycardia     Started seeing Dr. Rolan Mata and Dr. Pepito Prieto for this years ago    GERD (gastroesophageal reflux disease) 1998    Chano    Gout     Hypoglycemia     Mixed hyperlipidemia     Nausea & vomiting     PVC (premature ventricular contraction)      Past Surgical History:   Procedure Laterality Date    ECHO STRESS  2007    normal resting LV wall motion and no evidence of ischemia.  Gated EF (%): 60-65%    HX GI  1998    Nissen fundiplication    HX GI      polypectomy from colon    HX HERNIA REPAIR      Left IHR, right IHR, hiatal hernia    HX HERNIA REPAIR      HX KNEE ARTHROSCOPY  1972    right    HX OTHER SURGICAL  1989, 2005    excision 7 lipomas, excision 3 lipomas,mole excision back    HX OTHER SURGICAL      Echo 12 - Normal    HX UROLOGICAL      epididydectomy left    LOOP MONITOR      no significant arrhythmias infrequent atrial extrasystoles and ventricular extrasystoles. AL COMPRE EP EVAL ABLTJ ATR FIB PULM VEIN ISOLATION N/A 2020    ABLATION A-FIB  W COMPLETE EP STUDY performed by Tomas Ryder MD at [address] CATH LAB    AL INTRACARD ECHO, THER/DX INTERVENT N/A 2020    Intracardiac Echocardiogram performed by Tomas Ryder MD at [address] CATH LAB    AL INTRACARDIAC ELECTROPHYSIOLOGIC 3D MAPPING N/A 2020    Ep 3d Mapping performed by Tomas Ryder MD at [address] CATH LAB    AL TOTAL KNEE ARTHROPLASTY  2008    right     Family History   Problem Relation Age of Onset    Arrhythmia Father      Social History     Tobacco Use    Smoking status: Former     Packs/day: 1.00     Years: 12.00     Pack years: 12.00     Types: Cigarettes     Quit date: 3/31/1986     Years since quittin.6    Smokeless tobacco: Never   Substance Use Topics    Alcohol use: Yes     Alcohol/week: 10.0 standard drinks     Types: 10 Cans of beer per week        Review of Systems:   12 point review of systems was performed.  All negative except for HPI     Objective:   /78 (BP 1 Location: Left upper arm, BP Patient Position: Sitting)   Pulse 74   Ht 5' 11\" (1.803 m)   Wt 180 lb (81.6 kg)   SpO2 94%   BMI 25.10 kg/m²     Physical Exam:   General:  Alert and oriented, in no acute distress  Head:  Atraumatic, normocephalic  Eyes:  extraocular muscles intact  Neck:  Supple, normal range of motion  Lungs:  Clear to auscultation bilaterally, no wheezes/rales/rhonchi   Cardiovascular:  Regular rate and rhythm, normal S1-S2, no murmurs/rubs/gallops  Abdomen:  Soft, nontender, nondistended, normoactive bowel sounds  Skin:  Intact, no rash  Extremities:, no clubbing, cyanosis, or edema  Musculoskeletal: normal range of motion  Neurological:  Alert and oriented, no focal neurologic deficits  Psychiatric:  Normal mood and affect    No results found for: HBA1C, YWU0BUHB, WOJ9KQTW, HYJ2SPQI    12/22/20    ECHO TOYA W OR WO CONTRAST 01/08/2021 1/8/2021    Interpretation Summary  · LV: Calculated LVEF is 0%. · LA: Left atrial appendage velocity is normal (greater than 40 cm/sec). Signed by: Shavon Mccollum MD on 1/8/2021  7:56 AM      10/26/20    NUCLEAR CARDIAC STRESS TEST 10/27/2020 11/2/2020    Interpretation Summary  · Low risk Duke treadmill score. · Frequent PVC's during exercise (PVC\"s in bigeminy at times). · Negative treadmill stress test. No chest pain or ischemic EKG changes. · Left ventricular perfusion is normal.  · Gated SPECT: Left ventricular function post-stress was normal. Calculated ejection fraction is 55%. There is no evidence of transient ischemic dilation (TID). · Myocardial perfusion imaging supports a low risk stress test.    Signed by: Ashley Hurst MD on 10/27/2020  8:18 PM          Cardiac Evaluation Includes:  Treadmill stress test 12/2/13 - walked 10:04 (11.8 METS). Sinus bradycardia at rest. Stress  bpm (94% MPHR). No ischemic EKG changes. Frequent PVC's in exercise and recovery (asymptomatic). Echo 12/2/13 - LVEF 60-65%. Mild LAE. Echo 10/26/20 - LVEF 56%, mild LAE     Exercise Cardiolite 10/26/20 - walked 10 min (13.4 METS), No CP or ischemic EKG changes. Frequent PVC's during exercise (PVC\"s in bigeminy at times). Normal MPI. LVEF 55% --- He had similar PVC's with exercise in 2013.         AF Ablation 12/22/20     Results for orders placed or performed during the hospital encounter of 10/08/22   EKG, 12 LEAD, INITIAL   Result Value Ref Range    Ventricular Rate 108 BPM    Atrial Rate 66 BPM    QRS Duration 76 ms    Q-T Interval 304 ms    QTC Calculation (Bezet) 407 ms    Calculated R Axis 52 degrees    Calculated T Axis -41 degrees    Diagnosis       Atrial fibrillation with rapid ventricular response  Nonspecific ST and T wave abnormality  Abnormal ECG  When compared with ECG of 04-NOV-2020 10:31,  Atrial fibrillation has replaced Sinus rhythm  Vent. rate has increased BY  64 BPM  T wave inversion more evident in Inferior leads  QT has lengthened  Confirmed by Jc Reyes MD, [id number] ([id number]) on 10/11/2022 1:11:05 PM               Thank you for involving me in this patient's care and please call with further concerns or questions. ________________________________________  An Jennifer Gonzalez MD, [facility], [facility]  Cardiac Electrophysiology  [facility]  [address] [place] [address]                             [phone]     [address] [place] [address].  [address] [place] [address], [id number] [facility]  [phone]

## 2022-11-23 ENCOUNTER — OFFICE VISIT (OUTPATIENT)
Dept: CARDIOLOGY CLINIC | Age: 67
End: 2022-11-23
Payer: MEDICARE

## 2022-11-23 VITALS
DIASTOLIC BLOOD PRESSURE: 78 MMHG | WEIGHT: 180 LBS | HEART RATE: 74 BPM | HEIGHT: 71 IN | OXYGEN SATURATION: 94 % | SYSTOLIC BLOOD PRESSURE: 116 MMHG | BODY MASS INDEX: 25.2 KG/M2

## 2022-11-23 DIAGNOSIS — E78.2 MIXED HYPERLIPIDEMIA: ICD-10-CM

## 2022-11-23 DIAGNOSIS — I48.0 PAROXYSMAL ATRIAL FIBRILLATION (HCC): Primary | ICD-10-CM

## 2022-11-23 DIAGNOSIS — Z79.01 ANTICOAGULATION ADEQUATE: ICD-10-CM

## 2022-11-23 PROCEDURE — 99214 OFFICE O/P EST MOD 30 MIN: CPT | Performed by: INTERNAL MEDICINE

## 2022-11-23 PROCEDURE — 1123F ACP DISCUSS/DSCN MKR DOCD: CPT | Performed by: INTERNAL MEDICINE

## 2022-11-23 NOTE — PROGRESS NOTES
Catalina Graves is a 78 y.o. male    Chief Complaint   Patient presents with    Irregular Heart Beat       Vitals:    11/23/22 0809   BP: 116/78   BP 1 Location: Left upper arm   BP Patient Position: Sitting   Pulse: 74   Height: 5' 11\" (1.803 m)   Weight: 180 lb (81.6 kg)   SpO2: 94%       Chest pain no    SOB no    Dizziness no    Swelling no    Refills no      1. Have you been to the ER, urgent care clinic since your last visit? Hospitalized since your last visit? ED 10/8/22    2. Have you seen or consulted any other health care providers outside of the [address] [place] [address] since your last visit? Include any pap smears or colon screening.  No

## 2023-03-27 RX ORDER — APIXABAN 5 MG/1
TABLET, FILM COATED ORAL
Qty: 180 TABLET | Refills: 3 | Status: SHIPPED | OUTPATIENT
Start: 2023-03-27

## 2023-03-27 NOTE — TELEPHONE ENCOUNTER
Refill per VO of Dr. Hui Shaikh  Last appt: 11/23/2022  Future Appointments   Date Time Provider aMry Ju   5/5/2023  9:40 AM MD TERRY KeysF BS AMB   5/31/2023 11:20 AM Nita Styles MD CAVSF BS AMB       Requested Prescriptions     Pending Prescriptions Disp Refills    Eliquis 5 mg tablet [Pharmacy Med Name: ELIQUIS 5 MG TABLET] 180 Tablet 3     Sig: TAKE 1 TABLET BY MOUTH TWICE A DAY

## 2023-04-04 ENCOUNTER — HOSPITAL ENCOUNTER (OUTPATIENT)
Age: 68
End: 2023-04-04
Attending: EMERGENCY MEDICINE
Payer: MEDICARE

## 2023-04-04 LAB
ALBUMIN SERPL-MCNC: 3.6 G/DL (ref 3.5–5)
ALBUMIN/GLOB SERPL: 1.3 (ref 1.1–2.2)
ALP SERPL-CCNC: 61 U/L (ref 45–117)
ALT SERPL-CCNC: 36 U/L (ref 12–78)
ANION GAP SERPL CALC-SCNC: 3 MMOL/L (ref 5–15)
AST SERPL-CCNC: 30 U/L (ref 15–37)
BASOPHILS # BLD: 0.1 K/UL (ref 0–0.1)
BASOPHILS NFR BLD: 2 % (ref 0–1)
BILIRUB SERPL-MCNC: 1.7 MG/DL (ref 0.2–1)
BUN SERPL-MCNC: 19 MG/DL (ref 6–20)
BUN/CREAT SERPL: 16 (ref 12–20)
CALCIUM SERPL-MCNC: 10.5 MG/DL (ref 8.5–10.1)
CHLORIDE SERPL-SCNC: 113 MMOL/L (ref 97–108)
CO2 SERPL-SCNC: 24 MMOL/L (ref 21–32)
COMMENT, HOLDF: NORMAL
CREAT SERPL-MCNC: 1.18 MG/DL (ref 0.7–1.3)
DIFFERENTIAL METHOD BLD: ABNORMAL
EOSINOPHIL # BLD: 0.1 K/UL (ref 0–0.4)
EOSINOPHIL NFR BLD: 2 % (ref 0–7)
ERYTHROCYTE [DISTWIDTH] IN BLOOD BY AUTOMATED COUNT: 12.4 % (ref 11.5–14.5)
GLOBULIN SER CALC-MCNC: 2.8 G/DL (ref 2–4)
GLUCOSE SERPL-MCNC: 132 MG/DL (ref 65–100)
HCT VFR BLD AUTO: 44 % (ref 36.6–50.3)
HGB BLD-MCNC: 15 G/DL (ref 12.1–17)
IMM GRANULOCYTES # BLD AUTO: 0 K/UL (ref 0–0.04)
IMM GRANULOCYTES NFR BLD AUTO: 0 % (ref 0–0.5)
LYMPHOCYTES # BLD: 2.7 K/UL (ref 0.8–3.5)
LYMPHOCYTES NFR BLD: 42 % (ref 12–49)
MAGNESIUM SERPL-MCNC: 2 MG/DL (ref 1.6–2.4)
MCH RBC QN AUTO: 31.4 PG (ref 26–34)
MCHC RBC AUTO-ENTMCNC: 34.1 G/DL (ref 30–36.5)
MCV RBC AUTO: 92.1 FL (ref 80–99)
MONOCYTES # BLD: 0.5 K/UL (ref 0–1)
MONOCYTES NFR BLD: 8 % (ref 5–13)
NEUTS SEG # BLD: 2.9 K/UL (ref 1.8–8)
NEUTS SEG NFR BLD: 46 % (ref 32–75)
NRBC # BLD: 0 K/UL (ref 0–0.01)
NRBC BLD-RTO: 0 PER 100 WBC
PLATELET # BLD AUTO: 215 K/UL (ref 150–400)
PMV BLD AUTO: 9.5 FL (ref 8.9–12.9)
POTASSIUM SERPL-SCNC: 4.1 MMOL/L (ref 3.5–5.1)
PROT SERPL-MCNC: 6.4 G/DL (ref 6.4–8.2)
RBC # BLD AUTO: 4.78 M/UL (ref 4.1–5.7)
SAMPLES BEING HELD,HOLD: NORMAL
SODIUM SERPL-SCNC: 140 MMOL/L (ref 136–145)
TROPONIN I SERPL HS-MCNC: 42 NG/L (ref 0–76)
TROPONIN I SERPL HS-MCNC: 51 NG/L (ref 0–76)
WBC # BLD AUTO: 6.3 K/UL (ref 4.1–11.1)

## 2023-04-04 PROCEDURE — 36415 COLL VENOUS BLD VENIPUNCTURE: CPT

## 2023-04-04 PROCEDURE — 93005 ELECTROCARDIOGRAM TRACING: CPT

## 2023-04-04 PROCEDURE — 83735 ASSAY OF MAGNESIUM: CPT

## 2023-04-04 PROCEDURE — 85025 COMPLETE CBC W/AUTO DIFF WBC: CPT

## 2023-04-04 PROCEDURE — 80053 COMPREHEN METABOLIC PANEL: CPT

## 2023-04-04 PROCEDURE — 74011250636 HC RX REV CODE- 250/636: Performed by: EMERGENCY MEDICINE

## 2023-04-04 PROCEDURE — 99284 EMERGENCY DEPT VISIT MOD MDM: CPT

## 2023-04-04 PROCEDURE — 96360 HYDRATION IV INFUSION INIT: CPT

## 2023-04-04 PROCEDURE — 84484 ASSAY OF TROPONIN QUANT: CPT

## 2023-04-04 RX ADMIN — SODIUM CHLORIDE 1000 ML: 9 INJECTION, SOLUTION INTRAVENOUS at 18:15

## 2023-04-04 NOTE — ED PROVIDER NOTES
Trinity Hathaway is a 77 yo M with paroxysmal a fib who feels like he is in a fib. He has been doing yard work for the past 2 days and today felt very fatigued. He checked his HR and it as in the 120s and so he came to the ED. He denies chest pain, palpitation or shortness of breath. Past Medical History:   Diagnosis Date    Atrial fibrillation [facility])     ER visit on 2/16/12 - converted to NSR with Corvert;    Cardioverted in ED again 9/28/20. 2nd [facility] on 11/4/20    Bradycardia     Started seeing Dr. Casper Jeffrey and Dr. Suzanne Allison for this years ago    GERD (gastroesophageal reflux disease) 1998    Chano    Gout     Hypoglycemia     Mixed hyperlipidemia     Nausea & vomiting     PVC (premature ventricular contraction)        Past Surgical History:   Procedure Laterality Date    ECHO STRESS  2007    normal resting LV wall motion and no evidence of ischemia. Gated EF (%): 60-65%    HX GI  1998    Nissen fundiplication    HX GI      polypectomy from colon    HX HERNIA REPAIR      Left IHR, right IHR, hiatal hernia    HX HERNIA REPAIR      HX KNEE ARTHROSCOPY  1972    right    HX OTHER SURGICAL  1989, 2005    excision 7 lipomas, excision 3 lipomas,mole excision back    HX OTHER SURGICAL      Echo 2/17/12 - Normal    HX UROLOGICAL      epididydectomy left    LOOP MONITOR  2007    no significant arrhythmias infrequent atrial extrasystoles and ventricular extrasystoles.      HI COMPRE EP EVAL ABLTJ ATR FIB PULM VEIN ISOLATION N/A 12/22/2020    ABLATION A-FIB  W COMPLETE EP STUDY performed by Yunior Garzon MD at [address] CATH LAB    HI INTRACARD ECHO, THER/DX INTERVENT N/A 12/22/2020    Intracardiac Echocardiogram performed by Yunior Garzon MD at [address] CATH LAB    HI INTRACARDIAC ELECTROPHYSIOLOGIC 3D MAPPING N/A 12/22/2020    Ep 3d Mapping performed by Yunior Garzon MD at [address] CATH LAB    HI TOTAL KNEE ARTHROPLASTY  2008    right         Family History:   Problem Relation Age of Onset    Arrhythmia Father        Social History     Socioeconomic History    Marital status:      Spouse name: Not on file    Number of children: Not on file    Years of education: Not on file    Highest education level: Not on file   Occupational History    Not on file   Tobacco Use    Smoking status: Former     Packs/day: 1.00     Years: 12.00     Pack years: 12.00     Types: Cigarettes     Quit date: 3/31/1986     Years since quittin.0    Smokeless tobacco: Never   Substance and Sexual Activity    Alcohol use: Yes     Alcohol/week: 10.0 standard drinks     Types: 10 Cans of beer per week    Drug use: No    Sexual activity: Yes     Partners: Female   Other Topics Concern    Not on file   Social History Narrative    Not on file     Social Determinants of Health     Financial Resource Strain: Not on file   Food Insecurity: Not on file   Transportation Needs: Not on file   Physical Activity: Not on file   Stress: Not on file   Social Connections: Not on file   Intimate Partner Violence: Not on file   Housing Stability: Not on file         ALLERGIES: Vioxx [rofecoxib] and Zofran odt [ondansetron]    Review of Systems   Constitutional:  Positive for fatigue. Negative for fever. HENT:  Negative for sore throat. Eyes:  Negative for visual disturbance. Respiratory:  Negative for cough. Cardiovascular:  Positive for palpitations. Negative for chest pain. Gastrointestinal:  Negative for abdominal pain. Genitourinary:  Negative for dysuria. Musculoskeletal:  Negative for back pain. Skin:  Negative for rash. Neurological:  Negative for headaches. Vitals:    23 1805 23 1807   BP:  112/71   Pulse: 88    Resp: 16    Temp: 98.4 °F (36.9 °C)    SpO2: 95%    Weight: 81.6 kg (180 lb)    Height: 5' 11\" (1.803 m)             Physical Exam  Vitals and nursing note reviewed. Constitutional:       General: He is not in acute distress. Appearance: He is well-developed.    HENT:      Head: Normocephalic and atraumatic. Mouth/Throat:      Mouth: Mucous membranes are moist.   Eyes:      Extraocular Movements: Extraocular movements intact. Conjunctiva/sclera: Conjunctivae normal.   Neck:      Trachea: Phonation normal.   Cardiovascular:      Rate and Rhythm: Normal rate and regular rhythm. Heart sounds: No murmur heard. Pulmonary:      Effort: Pulmonary effort is normal. No respiratory distress. Breath sounds: No wheezing or rales. Abdominal:      General: There is no distension. Tenderness: There is no abdominal tenderness. There is no guarding. Musculoskeletal:         General: No tenderness. Normal range of motion. Cervical back: Normal range of motion and neck supple. Skin:     General: Skin is warm and dry. Neurological:      General: No focal deficit present. Mental Status: He is alert. He is not disoriented. Motor: No abnormal muscle tone. ED EKG interpretation:  Rhythm: normal sinus rhythm; and regular . Rate (approx.): 82; Axis: normal; P wave: normal; QRS interval: normal ; ST/T wave: NSST changes; Other findings: abnormal ekg. This EKG was interpreted by Carmenza Emery MD,ED Provider. Medical Decision Making  Amount and/or Complexity of Data Reviewed  Labs: ordered. 8:22 PM  Patient reassessed and is feeling better after IVNS. Labs reviewed and are normal.  Trop 42. Repeat troponin  pending. Plan for discharge home if stable. 8:32 PM  Change of shift. Care of patient signed over to Dr. Terry Rosario. Bedside handoff complete. Awaiting repeat troponin. Anticipate discharge home.          Procedures

## 2023-04-04 NOTE — ED TRIAGE NOTES
Pt arrives to the ER for complaints of fatigue and lightheadedness that started this morning. Pt reports that he has had afib in the past and felt that's what was causing his symptoms this morning.      Denies any chest pain or shortness of breath

## 2023-04-05 LAB
ATRIAL RATE: 82 BPM
CALCULATED P AXIS, ECG09: 41 DEGREES
CALCULATED R AXIS, ECG10: 32 DEGREES
CALCULATED T AXIS, ECG11: -12 DEGREES
DIAGNOSIS, 93000: NORMAL
P-R INTERVAL, ECG05: 160 MS
Q-T INTERVAL, ECG07: 354 MS
QRS DURATION, ECG06: 78 MS
QTC CALCULATION (BEZET), ECG08: 413 MS
VENTRICULAR RATE, ECG03: 82 BPM

## 2023-04-05 NOTE — ED NOTES
Care assumed from Dr. Willie Naranjo at 8:30 PM.  Please see her note for full H&P.  79-year-old male awaiting repeat troponin at time of signout. Repeat troponin measure returned negative. Recommended PCP follow up as needed and return precautions were given for worsening or concerns.

## 2023-05-05 ENCOUNTER — OFFICE VISIT (OUTPATIENT)
Dept: CARDIOLOGY CLINIC | Age: 68
End: 2023-05-05

## 2023-05-05 VITALS
OXYGEN SATURATION: 96 % | DIASTOLIC BLOOD PRESSURE: 88 MMHG | BODY MASS INDEX: 25.2 KG/M2 | HEART RATE: 63 BPM | SYSTOLIC BLOOD PRESSURE: 130 MMHG | WEIGHT: 180 LBS | HEIGHT: 71 IN

## 2023-05-05 DIAGNOSIS — I48.0 PAROXYSMAL ATRIAL FIBRILLATION (HCC): Primary | ICD-10-CM

## 2023-05-05 DIAGNOSIS — E78.2 MIXED HYPERLIPIDEMIA: ICD-10-CM

## 2023-05-23 RX ORDER — ASCORBIC ACID 500 MG
500 TABLET ORAL 2 TIMES DAILY
COMMUNITY

## 2023-05-23 RX ORDER — ACETAMINOPHEN 500 MG
1000 TABLET ORAL EVERY 8 HOURS PRN
COMMUNITY

## 2023-05-23 RX ORDER — SIMVASTATIN 40 MG
1 TABLET ORAL NIGHTLY
COMMUNITY
Start: 2023-02-23

## 2023-05-31 ENCOUNTER — OFFICE VISIT (OUTPATIENT)
Age: 68
End: 2023-05-31
Payer: MEDICARE

## 2023-05-31 VITALS
WEIGHT: 179.6 LBS | RESPIRATION RATE: 16 BRPM | SYSTOLIC BLOOD PRESSURE: 108 MMHG | BODY MASS INDEX: 25.15 KG/M2 | OXYGEN SATURATION: 97 % | HEART RATE: 58 BPM | DIASTOLIC BLOOD PRESSURE: 78 MMHG | HEIGHT: 71 IN

## 2023-05-31 DIAGNOSIS — E78.2 MIXED HYPERLIPIDEMIA: ICD-10-CM

## 2023-05-31 DIAGNOSIS — Z79.01 ANTICOAGULATION ADEQUATE: ICD-10-CM

## 2023-05-31 DIAGNOSIS — I48.0 PAROXYSMAL ATRIAL FIBRILLATION (HCC): Primary | ICD-10-CM

## 2023-05-31 PROCEDURE — 1036F TOBACCO NON-USER: CPT | Performed by: INTERNAL MEDICINE

## 2023-05-31 PROCEDURE — 99215 OFFICE O/P EST HI 40 MIN: CPT | Performed by: INTERNAL MEDICINE

## 2023-05-31 PROCEDURE — G8427 DOCREV CUR MEDS BY ELIG CLIN: HCPCS | Performed by: INTERNAL MEDICINE

## 2023-05-31 PROCEDURE — G8419 CALC BMI OUT NRM PARAM NOF/U: HCPCS | Performed by: INTERNAL MEDICINE

## 2023-05-31 PROCEDURE — 1123F ACP DISCUSS/DSCN MKR DOCD: CPT | Performed by: INTERNAL MEDICINE

## 2023-05-31 PROCEDURE — 3017F COLORECTAL CA SCREEN DOC REV: CPT | Performed by: INTERNAL MEDICINE

## 2023-05-31 RX ORDER — CHLORAL HYDRATE 500 MG
CAPSULE ORAL 2 TIMES DAILY
COMMUNITY

## 2023-05-31 NOTE — PROGRESS NOTES
Room #:  3    Chief Complaint   Patient presents with    Atrial Fibrillation    Follow-up     6 month follow up     /78 (Site: Left Upper Arm, Position: Sitting, Cuff Size: Medium Adult)   Pulse 58   Resp 16   Ht 5' 11\" (1.803 m)   Wt 179 lb 9.6 oz (81.5 kg)   SpO2 97%   BMI 25.05 kg/m²       Chest pain:  NO  Shortness of breath:  NO  Edema: NO  Palpitations, skipped beats, rapid heartbeat:yes  Dizziness:  NO  Fatigue: NO     1. Have you been to the ER, urgent care clinic since your last visit? Hospitalized since your last visit? SFM: Afib and 4/4/23-dehydration    2. Have you seen or consulted any other health care providers outside of the [address] [place] [address] since your last visit? Include any pap smears or colon screening.  NO      Refills:  NO

## 2023-05-31 NOTE — PROGRESS NOTES
Cardiac Electrophysiology Office Follow-up    NAME: Edson Reyes   :  1955  MRM:  [id number]    Date:  2023         Assessment and Plan:     1. Paroxysmal atrial fibrillation (HCC)  2. Anticoagulation adequate  3. Mixed hyperlipidemia       atrial fibrillation  Symptomatic paroxysmal atrial fibrillation status post prior A. fib ablation on 2020 with Dr. Iris Teague. He has had multiple prior cardioversions. Has been doing well until recently on 10/8/2022 had recurrent persistent  atrial fibrillation with associated dyspnea on exertion. Underwent cardioversion. Bradycardia limits long-term antiarrhythmic therapy or even rusty blocking agents. He would like to remain off medicines. - increased in frequency and burden of episodes of AFib  - The implication regarding the diagnosis of AF was explained to the patient in great detail  including the associated risk of CVA, AF-mediated cardiomyopathy, and progression into persistent/chronic AF. - Spoke to patient regarding  the potential role repeat ablation given his inability to tolerate any rusty or antiarrhythmic medications. If he has any further recurrent atrial fibrillation bouts, this would be the next step. He's now interested in proceeding with ablation  - The implication regarding the diagnosis of AF was explained to the patient in great detail including the associated risk of CVA, AF-mediated cardiomyopathy, and progression into persistent/chronic AF.  - Literature from the EAST-AFNET 4 Trial demonstrated that early rhythm control management in patients with early diagnosis of atrial fibrillation (median time of diagnosis, 36 days) resulted in reduction in cardiovascular mortality, stroke, or hospitalization with worsening heart failure or ACS. - I have discussed options including continued medical therapy and ablation in detail.  I have discussed the risks of left atrial ablation including vascular complications, infection, MI,

## 2023-06-01 ENCOUNTER — TELEPHONE (OUTPATIENT)
Age: 68
End: 2023-06-01

## 2023-06-01 NOTE — TELEPHONE ENCOUNTER
Spoke with Pt of Afib Ablation schd. For 8/4/23 at 12pm arrive at 10am at [address] w/Dr. Radha Chiang Pt aware that they need a  NPO from [address] the night before. Check in at the First floor Outpt . Desk  .  Pt is to have Labs done on 7/4/23-7/28/23 Medications:  Hold Eliquis day of procedure   VO by /nurse Marquis Mcgee.

## 2023-07-19 ENCOUNTER — PREP FOR PROCEDURE (OUTPATIENT)
Age: 68
End: 2023-07-19

## 2023-07-19 RX ORDER — SODIUM CHLORIDE 0.9 % (FLUSH) 0.9 %
5-40 SYRINGE (ML) INJECTION PRN
Status: CANCELLED | OUTPATIENT
Start: 2023-07-19

## 2023-07-19 RX ORDER — SODIUM CHLORIDE 0.9 % (FLUSH) 0.9 %
5-40 SYRINGE (ML) INJECTION EVERY 12 HOURS SCHEDULED
Status: CANCELLED | OUTPATIENT
Start: 2023-07-19

## 2023-07-19 RX ORDER — SODIUM CHLORIDE 9 MG/ML
INJECTION, SOLUTION INTRAVENOUS PRN
Status: CANCELLED | OUTPATIENT
Start: 2023-07-19

## 2023-07-22 LAB
BASOPHILS # BLD AUTO: 0.1 X10E3/UL (ref 0–0.2)
BASOPHILS NFR BLD AUTO: 2 %
BUN SERPL-MCNC: 15 MG/DL (ref 8–27)
BUN/CREAT SERPL: 13 (ref 10–24)
CALCIUM SERPL-MCNC: 10.7 MG/DL (ref 8.6–10.2)
CHLORIDE SERPL-SCNC: 102 MMOL/L (ref 96–106)
CO2 SERPL-SCNC: 26 MMOL/L (ref 20–29)
CREAT SERPL-MCNC: 1.16 MG/DL (ref 0.76–1.27)
EGFRCR SERPLBLD CKD-EPI 2021: 69 ML/MIN/1.73
EOSINOPHIL # BLD AUTO: 0.1 X10E3/UL (ref 0–0.4)
EOSINOPHIL NFR BLD AUTO: 3 %
ERYTHROCYTE [DISTWIDTH] IN BLOOD BY AUTOMATED COUNT: 11.6 % (ref 11.6–15.4)
GLUCOSE SERPL-MCNC: 94 MG/DL (ref 70–99)
HCT VFR BLD AUTO: 47.1 % (ref 37.5–51)
HGB BLD-MCNC: 15.6 G/DL (ref 13–17.7)
IMM GRANULOCYTES # BLD AUTO: 0 X10E3/UL (ref 0–0.1)
IMM GRANULOCYTES NFR BLD AUTO: 0 %
LYMPHOCYTES # BLD AUTO: 1.5 X10E3/UL (ref 0.7–3.1)
LYMPHOCYTES NFR BLD AUTO: 38 %
MCH RBC QN AUTO: 31 PG (ref 26.6–33)
MCHC RBC AUTO-ENTMCNC: 33.1 G/DL (ref 31.5–35.7)
MCV RBC AUTO: 94 FL (ref 79–97)
MONOCYTES # BLD AUTO: 0.4 X10E3/UL (ref 0.1–0.9)
MONOCYTES NFR BLD AUTO: 9 %
NEUTROPHILS # BLD AUTO: 2 X10E3/UL (ref 1.4–7)
NEUTROPHILS NFR BLD AUTO: 48 %
PLATELET # BLD AUTO: 205 X10E3/UL (ref 150–450)
POTASSIUM SERPL-SCNC: 4.9 MMOL/L (ref 3.5–5.2)
RBC # BLD AUTO: 5.04 X10E6/UL (ref 4.14–5.8)
SODIUM SERPL-SCNC: 140 MMOL/L (ref 134–144)
WBC # BLD AUTO: 4 X10E3/UL (ref 3.4–10.8)

## 2023-08-04 ENCOUNTER — TELEPHONE (OUTPATIENT)
Age: 68
End: 2023-08-04

## 2023-08-04 ENCOUNTER — ANESTHESIA (OUTPATIENT)
Facility: HOSPITAL | Age: 68
End: 2023-08-04
Payer: MEDICARE

## 2023-08-04 ENCOUNTER — ANESTHESIA EVENT (OUTPATIENT)
Facility: HOSPITAL | Age: 68
End: 2023-08-04
Payer: MEDICARE

## 2023-08-04 ENCOUNTER — HOSPITAL ENCOUNTER (OUTPATIENT)
Facility: HOSPITAL | Age: 68
Discharge: HOME | End: 2023-08-04
Attending: INTERNAL MEDICINE | Admitting: INTERNAL MEDICINE
Payer: MEDICARE

## 2023-08-04 VITALS
HEIGHT: 71 IN | HEART RATE: 67 BPM | WEIGHT: 180 LBS | OXYGEN SATURATION: 97 % | TEMPERATURE: 97.7 F | DIASTOLIC BLOOD PRESSURE: 73 MMHG | SYSTOLIC BLOOD PRESSURE: 128 MMHG | RESPIRATION RATE: 15 BRPM | BODY MASS INDEX: 25.2 KG/M2

## 2023-08-04 DIAGNOSIS — I48.91 ATRIAL FIBRILLATION, UNSPECIFIED TYPE (HCC): ICD-10-CM

## 2023-08-04 LAB
ABO + RH BLD: NORMAL
ACT BLD: 245 SECS (ref 79–138)
ACT BLD: 293 SECS (ref 79–138)
BLOOD GROUP ANTIBODIES SERPL: NORMAL
ECHO BSA: 2.02 M2
EKG ATRIAL RATE: 57 BPM
EKG DIAGNOSIS: NORMAL
EKG P AXIS: 34 DEGREES
EKG P-R INTERVAL: 192 MS
EKG Q-T INTERVAL: 416 MS
EKG QRS DURATION: 84 MS
EKG QTC CALCULATION (BAZETT): 432 MS
EKG R AXIS: 37 DEGREES
EKG T AXIS: -5 DEGREES
EKG VENTRICULAR RATE: 65 BPM
SPECIMEN EXP DATE BLD: NORMAL

## 2023-08-04 PROCEDURE — 2500000003 HC RX 250 WO HCPCS

## 2023-08-04 PROCEDURE — 3700000001 HC ADD 15 MINUTES (ANESTHESIA): Performed by: INTERNAL MEDICINE

## 2023-08-04 PROCEDURE — 2580000003 HC RX 258: Performed by: NURSE PRACTITIONER

## 2023-08-04 PROCEDURE — 93657 TX L/R ATRIAL FIB ADDL: CPT | Performed by: INTERNAL MEDICINE

## 2023-08-04 PROCEDURE — 6360000002 HC RX W HCPCS

## 2023-08-04 PROCEDURE — 86850 RBC ANTIBODY SCREEN: CPT

## 2023-08-04 PROCEDURE — 76937 US GUIDE VASCULAR ACCESS: CPT | Performed by: INTERNAL MEDICINE

## 2023-08-04 PROCEDURE — 93623 PRGRMD STIMJ&PACG IV RX NFS: CPT | Performed by: INTERNAL MEDICINE

## 2023-08-04 PROCEDURE — 92960 CARDIOVERSION ELECTRIC EXT: CPT | Performed by: INTERNAL MEDICINE

## 2023-08-04 PROCEDURE — 93662 INTRACARDIAC ECG (ICE): CPT | Performed by: INTERNAL MEDICINE

## 2023-08-04 PROCEDURE — 93005 ELECTROCARDIOGRAM TRACING: CPT | Performed by: NURSE PRACTITIONER

## 2023-08-04 PROCEDURE — 36415 COLL VENOUS BLD VENIPUNCTURE: CPT

## 2023-08-04 PROCEDURE — 93622 COMP EP EVAL L VENTR PAC&REC: CPT | Performed by: INTERNAL MEDICINE

## 2023-08-04 PROCEDURE — 86900 BLOOD TYPING SEROLOGIC ABO: CPT

## 2023-08-04 PROCEDURE — 6360000002 HC RX W HCPCS: Performed by: NURSE PRACTITIONER

## 2023-08-04 PROCEDURE — C1894 INTRO/SHEATH, NON-LASER: HCPCS | Performed by: INTERNAL MEDICINE

## 2023-08-04 PROCEDURE — 93613 INTRACARDIAC EPHYS 3D MAPG: CPT | Performed by: INTERNAL MEDICINE

## 2023-08-04 PROCEDURE — 93656 COMPRE EP EVAL ABLTJ ATR FIB: CPT | Performed by: INTERNAL MEDICINE

## 2023-08-04 PROCEDURE — C1760 CLOSURE DEV, VASC: HCPCS | Performed by: INTERNAL MEDICINE

## 2023-08-04 PROCEDURE — C1766 INTRO/SHEATH,STRBLE,NON-PEEL: HCPCS | Performed by: INTERNAL MEDICINE

## 2023-08-04 PROCEDURE — 6370000000 HC RX 637 (ALT 250 FOR IP): Performed by: NURSE PRACTITIONER

## 2023-08-04 PROCEDURE — 2720000010 HC SURG SUPPLY STERILE: Performed by: INTERNAL MEDICINE

## 2023-08-04 PROCEDURE — 93655 ICAR CATH ABLTJ DSCRT ARRHYT: CPT | Performed by: INTERNAL MEDICINE

## 2023-08-04 PROCEDURE — 85347 COAGULATION TIME ACTIVATED: CPT

## 2023-08-04 PROCEDURE — 86901 BLOOD TYPING SEROLOGIC RH(D): CPT

## 2023-08-04 PROCEDURE — 2580000003 HC RX 258

## 2023-08-04 PROCEDURE — C1759 CATH, INTRA ECHOCARDIOGRAPHY: HCPCS | Performed by: INTERNAL MEDICINE

## 2023-08-04 PROCEDURE — 2709999900 HC NON-CHARGEABLE SUPPLY: Performed by: INTERNAL MEDICINE

## 2023-08-04 PROCEDURE — C1732 CATH, EP, DIAG/ABL, 3D/VECT: HCPCS | Performed by: INTERNAL MEDICINE

## 2023-08-04 PROCEDURE — 3700000000 HC ANESTHESIA ATTENDED CARE: Performed by: INTERNAL MEDICINE

## 2023-08-04 RX ORDER — ROCURONIUM BROMIDE 10 MG/ML
INJECTION, SOLUTION INTRAVENOUS PRN
Status: DISCONTINUED | OUTPATIENT
Start: 2023-08-04 | End: 2023-08-04 | Stop reason: SDUPTHER

## 2023-08-04 RX ORDER — HEPARIN SODIUM 10000 [USP'U]/100ML
INJECTION, SOLUTION INTRAVENOUS CONTINUOUS PRN
Status: DISCONTINUED | OUTPATIENT
Start: 2023-08-04 | End: 2023-08-04 | Stop reason: SDUPTHER

## 2023-08-04 RX ORDER — FLECAINIDE ACETATE 100 MG/1
100 TABLET ORAL 2 TIMES DAILY
Qty: 60 TABLET | Refills: 0 | Status: SHIPPED | OUTPATIENT
Start: 2023-08-04

## 2023-08-04 RX ORDER — LIDOCAINE HYDROCHLORIDE 20 MG/ML
INJECTION, SOLUTION EPIDURAL; INFILTRATION; INTRACAUDAL; PERINEURAL PRN
Status: DISCONTINUED | OUTPATIENT
Start: 2023-08-04 | End: 2023-08-04 | Stop reason: SDUPTHER

## 2023-08-04 RX ORDER — ACETAMINOPHEN 325 MG/1
650 TABLET ORAL EVERY 4 HOURS PRN
Status: DISCONTINUED | OUTPATIENT
Start: 2023-08-04 | End: 2023-08-04 | Stop reason: HOSPADM

## 2023-08-04 RX ORDER — SODIUM CHLORIDE 0.9 % (FLUSH) 0.9 %
5-40 SYRINGE (ML) INJECTION PRN
Status: DISCONTINUED | OUTPATIENT
Start: 2023-08-04 | End: 2023-08-04 | Stop reason: HOSPADM

## 2023-08-04 RX ORDER — PANTOPRAZOLE SODIUM 20 MG/1
20 TABLET, DELAYED RELEASE ORAL 2 TIMES DAILY
Qty: 60 TABLET | Refills: 0 | Status: SHIPPED | OUTPATIENT
Start: 2023-08-04

## 2023-08-04 RX ORDER — HEPARIN SODIUM 1000 [USP'U]/ML
INJECTION, SOLUTION INTRAVENOUS; SUBCUTANEOUS PRN
Status: DISCONTINUED | OUTPATIENT
Start: 2023-08-04 | End: 2023-08-04 | Stop reason: SDUPTHER

## 2023-08-04 RX ORDER — SODIUM CHLORIDE 0.9 % (FLUSH) 0.9 %
5-40 SYRINGE (ML) INJECTION EVERY 12 HOURS SCHEDULED
Status: DISCONTINUED | OUTPATIENT
Start: 2023-08-04 | End: 2023-08-04 | Stop reason: HOSPADM

## 2023-08-04 RX ORDER — SODIUM CHLORIDE 9 MG/ML
INJECTION, SOLUTION INTRAVENOUS PRN
Status: DISCONTINUED | OUTPATIENT
Start: 2023-08-04 | End: 2023-08-04 | Stop reason: HOSPADM

## 2023-08-04 RX ORDER — KETOROLAC TROMETHAMINE 30 MG/ML
15 INJECTION, SOLUTION INTRAMUSCULAR; INTRAVENOUS EVERY 6 HOURS PRN
Status: DISCONTINUED | OUTPATIENT
Start: 2023-08-04 | End: 2023-08-04 | Stop reason: HOSPADM

## 2023-08-04 RX ORDER — PROTAMINE SULFATE 10 MG/ML
INJECTION, SOLUTION INTRAVENOUS PRN
Status: DISCONTINUED | OUTPATIENT
Start: 2023-08-04 | End: 2023-08-04 | Stop reason: SDUPTHER

## 2023-08-04 RX ORDER — SUCCINYLCHOLINE CHLORIDE 20 MG/ML
INJECTION INTRAMUSCULAR; INTRAVENOUS PRN
Status: DISCONTINUED | OUTPATIENT
Start: 2023-08-04 | End: 2023-08-04 | Stop reason: SDUPTHER

## 2023-08-04 RX ORDER — DEXAMETHASONE SODIUM PHOSPHATE 4 MG/ML
INJECTION, SOLUTION INTRA-ARTICULAR; INTRALESIONAL; INTRAMUSCULAR; INTRAVENOUS; SOFT TISSUE PRN
Status: DISCONTINUED | OUTPATIENT
Start: 2023-08-04 | End: 2023-08-04 | Stop reason: SDUPTHER

## 2023-08-04 RX ORDER — FENTANYL CITRATE 50 UG/ML
INJECTION, SOLUTION INTRAMUSCULAR; INTRAVENOUS PRN
Status: DISCONTINUED | OUTPATIENT
Start: 2023-08-04 | End: 2023-08-04 | Stop reason: SDUPTHER

## 2023-08-04 RX ADMIN — ROCURONIUM BROMIDE 10 MG: 10 SOLUTION INTRAVENOUS at 11:15

## 2023-08-04 RX ADMIN — LIDOCAINE HYDROCHLORIDE 80 MG: 20 INJECTION, SOLUTION EPIDURAL; INFILTRATION; INTRACAUDAL; PERINEURAL at 11:15

## 2023-08-04 RX ADMIN — PROPOFOL 150 MG: 10 INJECTION, EMULSION INTRAVENOUS at 11:15

## 2023-08-04 RX ADMIN — SODIUM CHLORIDE: 9 INJECTION, SOLUTION INTRAVENOUS at 11:05

## 2023-08-04 RX ADMIN — DEXAMETHASONE SODIUM PHOSPHATE 4 MG: 4 INJECTION, SOLUTION INTRAMUSCULAR; INTRAVENOUS at 11:24

## 2023-08-04 RX ADMIN — PHENYLEPHRINE HYDROCHLORIDE 30 MCG/MIN: 10 INJECTION INTRAVENOUS at 11:30

## 2023-08-04 RX ADMIN — FENTANYL CITRATE 50 MCG: 50 INJECTION, SOLUTION INTRAMUSCULAR; INTRAVENOUS at 11:37

## 2023-08-04 RX ADMIN — FENTANYL CITRATE 50 MCG: 50 INJECTION, SOLUTION INTRAMUSCULAR; INTRAVENOUS at 11:15

## 2023-08-04 RX ADMIN — PROTAMINE SULFATE 50 MG: 10 INJECTION, SOLUTION INTRAVENOUS at 12:57

## 2023-08-04 RX ADMIN — PROPOFOL 50 MG: 10 INJECTION, EMULSION INTRAVENOUS at 11:37

## 2023-08-04 RX ADMIN — HEPARIN SODIUM 2000 UNITS: 1000 INJECTION, SOLUTION INTRAVENOUS; SUBCUTANEOUS at 12:21

## 2023-08-04 RX ADMIN — ISOPROTERENOL HYDROCHLORIDE 10 MCG/MIN: 0.2 INJECTION, SOLUTION INTRAMUSCULAR; INTRAVENOUS at 12:35

## 2023-08-04 RX ADMIN — HEPARIN SODIUM 11000 UNITS: 1000 INJECTION, SOLUTION INTRAVENOUS; SUBCUTANEOUS at 11:41

## 2023-08-04 RX ADMIN — SUCCINYLCHOLINE CHLORIDE 120 MG: 20 INJECTION, SOLUTION INTRAMUSCULAR; INTRAVENOUS at 11:16

## 2023-08-04 RX ADMIN — HEPARIN SODIUM 5000 UNITS: 1000 INJECTION, SOLUTION INTRAVENOUS; SUBCUTANEOUS at 12:00

## 2023-08-04 RX ADMIN — HEPARIN SODIUM 1100 UNITS/HR: 10000 INJECTION, SOLUTION INTRAVENOUS at 11:41

## 2023-08-04 RX ADMIN — KETOROLAC TROMETHAMINE 15 MG: 30 INJECTION, SOLUTION INTRAMUSCULAR; INTRAVENOUS at 15:01

## 2023-08-04 RX ADMIN — ACETAMINOPHEN 650 MG: 325 TABLET ORAL at 14:38

## 2023-08-04 ASSESSMENT — PAIN DESCRIPTION - ORIENTATION
ORIENTATION: MID
ORIENTATION: MID

## 2023-08-04 ASSESSMENT — PAIN DESCRIPTION - LOCATION
LOCATION: BACK
LOCATION: CHEST;BACK

## 2023-08-04 ASSESSMENT — PAIN SCALES - GENERAL
PAINLEVEL_OUTOF10: 5
PAINLEVEL_OUTOF10: 5

## 2023-08-04 ASSESSMENT — PAIN DESCRIPTION - DESCRIPTORS
DESCRIPTORS: OTHER (COMMENT)
DESCRIPTORS: OTHER (COMMENT)

## 2023-08-04 NOTE — PROGRESS NOTES
Cardiac Cath Lab Recovery Arrival Note:      Denzel Serrano arrived to Cardiac Cath Lab, Recovery Area. Staff introduced to patient. Patient identifiers verified with NAME and DATE OF BIRTH. Procedure verified with patient. Consent forms reviewed and signed by patient or authorized representative and verified. Allergies verified. Patient and family oriented to department. Patient and family informed of procedure and plan of care. Questions answered with review. Patient prepped for procedure, per orders from physician, prior to arrival.    Patient on cardiac monitor, non-invasive blood pressure, SPO2 monitor. On room air. Patient is A&Ox 4. Patient reports no complaints. Patient in stretcher, in low position, with side rails up, call bell within reach, patient instructed to call if assistance as needed. Patient prep in: [facility] Recovery Area, [place] fast track 6. Patient family has pager # Alexyassine Luis Miguel [phone]  Family in: upstairs.

## 2023-08-04 NOTE — PROGRESS NOTES
Ambulated patient to the bathroom with a steady gait, voided without difficulty. Patient denies chest pain, shortness of breath, or dizziness. Returned to stretcher. Vital signs stable. Procedural site is clean, dry, and intact. No bleeding, no hematoma. Assisted patient in dressing/Patient dressed self. Educated patient about their sedation precautions such as not driving, operating any machinery, or signing legal documents 24 hours post procedure. Reviewed discharge instructions, including medications and site care using the teach back method. Answered all questions. Verbalized understanding. Removed peripheral IV. Escorted to discharge area in a wheelchair with all of their belongings including cell phone and glasses    Patient's spouse present to take patient home. Reviewed discharge instructions with patients' spouse, they verbalized understanding. Pt denies pain, relived by medication and position/pt.

## 2023-08-04 NOTE — TELEPHONE ENCOUNTER
Called patient, ID verified using two patient identifiers. Notified Mr. Charles Landers that we have had a cancellation the morning and asked if could arrive earlier for his procedure today. Asked if he can arrive around 8:30 am.    Mr. Charles Landers states he lives quite a bit away from [facility] but he will head that way as soon as possible.

## 2023-08-04 NOTE — PROGRESS NOTES
EP LAB to Recovery Room Report    Procedure: RF A. Fib Ablation    MD: Nita Kathleen heart rhythm: Bradycardia  Verbal Report given to Recovery Nurse on patient being transferred to Recovery Room for routine post-op. Patient stable upon transfer to . Pt had General Anesthesia. Vitals, mental status, MAR, procedural summary discussed with recovery RN. Patient cardioverted x2 during procedure. A total of Heparin 23417 units given. Protamine 50mg given post ablation. Post-procedure heart rhythm: NSR/SB    Sheaths:  1302hrs:  Right femoral vein 8.5fr sheath removed, closed with Perclose. Left femoral vein 8fr sheath removed, closed with Perclose. Left femoral vein 11fr sheath removed, closed with Perclose.

## 2023-08-04 NOTE — H&P
Office note from 23 reviewed. No interim changes. -----------------  Cardiac Electrophysiology Office Follow-up     NAME:            Yessy Crowell      :               1955  MRM:              [id number]     Date:               2023            Assessment and Plan:      1. Paroxysmal atrial fibrillation (HCC)  2. Anticoagulation adequate  3. Mixed hyperlipidemia        atrial fibrillation  Symptomatic paroxysmal atrial fibrillation status post prior A. fib ablation on 2020 with Dr. Osmin Veloz. He has had multiple prior cardioversions. Has been doing well until recently on 10/8/2022 had recurrent persistent  atrial fibrillation with associated dyspnea on exertion. Underwent cardioversion. Bradycardia limits long-term antiarrhythmic therapy or even rusty blocking agents. He would like to remain off medicines. - increased in frequency and burden of episodes of AFib  - The implication regarding the diagnosis of AF was explained to the patient in great detail  including the associated risk of CVA, AF-mediated cardiomyopathy, and progression into persistent/chronic AF. - Spoke to patient regarding  the potential role repeat ablation given his inability to tolerate any rusty or antiarrhythmic medications. If he has any further recurrent atrial fibrillation bouts, this would be the next step. He's now interested in proceeding with ablation  - The implication regarding the diagnosis of AF was explained to the patient in great detail including the associated risk of CVA, AF-mediated cardiomyopathy, and progression into persistent/chronic AF.  - Literature from the EAST-AFNET 4 Trial demonstrated that early rhythm control management in patients with early diagnosis of atrial fibrillation (median time of diagnosis, 36 days) resulted in reduction in cardiovascular mortality, stroke, or hospitalization with worsening heart failure or ACS.   - I have discussed options including continued medical

## 2023-08-04 NOTE — PROGRESS NOTES
Cardiac Cath Lab Procedure Area Arrival Note:    Giovanna Crawford arrived to Cardiac Cath Lab, Procedure Area. Patient identifiers verified with NAME and DATE OF BIRTH. Procedure verified with patient. Consent forms verified. Allergies verified. Patient informed of procedure and plan of care. Questions answered with review. Patient voiced understanding of procedure and plan of care. Patient on cardiac monitor, non-invasive blood pressure, SPO2 monitor. On room air; airway to be managed by CRNA for duration of procedure. IV of normal saline on pump at 25 ml/hr. Patient status doing well without problems. Patient is A&Ox 4. Patient reports no complaints of pain. Patient medicated during procedure with orders obtained and verified by Dr. Pinky Humphries. Refer to patients Cardiac Cath Lab PROCEDURE REPORT for vital signs, assessment, status, and response during procedure, printed at end of case. Printed report on chart or scanned into chart.

## 2023-08-04 NOTE — PROCEDURES
RADIOFREQUENCY ATRIAL FIBRILLATION ABLATION  TYPICAL ATRIAL FLUTTER ABLATION  POSTERIOR WALL ISOLATION ABLATION  SUBSTRATE MODIFICATION ABLATION    Procedure Date: 08/04/23   Lab Physician: Naty Adkins MD    Indications:  81 yo gentleman with a history HLD, PAF s/p prior PVI with Dr. Jaziel Sagastume (12/22/20) s/p DCCV now referred for Afib ablation. SHEATH INSERTION  All sheaths were placed using the modified Seldinger technique with ultrasound guided assistance  Right Femoral Vein: 8.5Fr Agilis sheath  Left Femoral Vein: 8Fr and a 11F sheath    CATHETER INSERTION  Catheters were advanced to the following positions using fluoroscopic guidance:  Coronary Sinus: : BiosRB-Doors Bidirectional Decapolar  LA: BiosRB-Doors OctaRay Mapping catheter  Ablation: BiosRB-Doors ST/SF D/F ablation catheter  ICE in RA/RV: BiosRB-Doors Intracardiac Ultrasound    PROCEDURE NARRATIVE:  EPS and RFA were discussed with the patient in great detail. The risks of bleeding, infection, vascular injury, pulmonary embolus, cardiac perforation, heart block necessitating pacemaker insertion, stroke, MI and death were among those discussed. Alternatives were reviewed including the option of no therapy. All questions were answered. The patient agreed to proceed. Written informed consent was obtained from the patient after a full explanation of the risks and benefits of the procedure. The patient was brought to the lab in the fasting state. Continuous electrocardiographic and hemodynamic monitoring was initiated. The patient was prepped and draped in the usual sterile fashion. General anesthesia with intubation and mechanical conventional ventilation was used. Anesthesia staff performed the intubation and monitoring during the case. Esophageal temperature monitoring was performed with the CIRCA esophageal temperature probe throughout the case.      CARDIOVERSION  Patient was cardioverted in the case with externalized synchronized energy at 200J to facilitate sinus cycle length 430 ms  Atrial /270 ms  Av rusty /330 ms  Left ventricular pacing was performed with VERP 600/270 ms      Vascular Access Closure  Post-ablation, ICE demonstrated no evidence of pericardial effusion. All catheters were removed. Protamine was administered to partially correct the ACT to near 200 seconds. All sheaths were removed and hemostasis was adequately achieved at all venotomy sites using Perclose vascular closure device and manual pressure. The patient was extubated, remained hemodynamically stable, tolerated the procedure well and was transferred in good condition to the PACU. Members of the general anesthesia staff and the EP nursing staff provided appropriate monitoring throughout the procedure. The patient tolerated the procedure well and left the laboratory in good condition. Conclusion:  1. Paroxysmal atrial fibrillation s/p successful redo pulmonary vein isolation. 2. Additional substrate modification ablation targeting extrapulmonary veins triggers with ganglionated plexi (GP) ablation. 3. 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 4. 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 5. Inducible typical Isthmus-dependent atrial flutter s/p successful cavotricuspid isthmus ablation. 6. No acute PVs reconnection noted after a waiting period and with administration of Isuprel. 7. No inducible arrhythmia at the end of case. 8. Normal His-Purkinje system    Plans:  1. Bedrest x 2 hours. 2. Observation to rule out post-procedure complications including CVA, CHF, and bleeding. 3. Continue systemic anticoagulation. 4. Continue home medications  5. PPI BID for 1 month.   6. Outpatient follow up with EP in 1 month

## 2023-08-14 NOTE — TELEPHONE ENCOUNTER
Requested Prescriptions     Signed Prescriptions Disp Refills    apixaban (Eliquis) 5 mg tablet 180 Tablet 1     Sig: Take 1 Tablet by mouth two (2) times a day. Authorizing Provider: Maria L Sierra     Ordering User: Allyson Gerardo     Refill per verbal order Dr. Britney Chatterjee Otezla Counseling: The side effects of Otezla were discussed with the patient, including but not limited to worsening or new depression, weight loss, diarrhea, nausea, upper respiratory tract infection, and headache. Patient instructed to call the office should any adverse effect occur.  The patient verbalized understanding of the proper use and possible adverse effects of Otezla.  All the patient's questions and concerns were addressed.

## 2023-08-28 RX ORDER — FLECAINIDE ACETATE 100 MG/1
100 TABLET ORAL 2 TIMES DAILY
Qty: 60 TABLET | Refills: 1 | Status: SHIPPED | OUTPATIENT
Start: 2023-08-28

## 2023-08-28 RX ORDER — PANTOPRAZOLE SODIUM 20 MG/1
TABLET, DELAYED RELEASE ORAL
Qty: 60 TABLET | Refills: 0 | OUTPATIENT
Start: 2023-08-28

## 2023-08-28 NOTE — TELEPHONE ENCOUNTER
Requested Prescriptions     Signed Prescriptions Disp Refills    flecainide (TAMBOCOR) 100 MG tablet 60 tablet 1     Sig: Take 1 tablet by mouth 2 times daily     Authorizing Provider: Shelli Iqbal     Ordering User: Eufemia Pearson     Refused Prescriptions Disp Refills    pantoprazole (PROTONIX) 20 MG tablet [Pharmacy Med Name: PANTOPRAZOLE SOD DR 20 MG TAB] 60 tablet 0     Sig: TAKE 1 TABLET BY MOUTH TWICE A DAY     Refused By: Eufemia Pearson     Reason for Refusal: Refill not appropriate     Refill per verbal order Isidoro Shankar. Last Visit: 5/31/23  Next Visit: 9/22/23  Protonix only prescribed for 30 days post ablation on 8/4/2.

## 2023-09-22 ENCOUNTER — OFFICE VISIT (OUTPATIENT)
Age: 68
End: 2023-09-22
Payer: MEDICARE

## 2023-09-22 VITALS
WEIGHT: 177 LBS | HEIGHT: 71 IN | OXYGEN SATURATION: 98 % | SYSTOLIC BLOOD PRESSURE: 102 MMHG | BODY MASS INDEX: 24.78 KG/M2 | HEART RATE: 58 BPM | RESPIRATION RATE: 14 BRPM | DIASTOLIC BLOOD PRESSURE: 60 MMHG

## 2023-09-22 DIAGNOSIS — Z79.899 HIGH RISK MEDICATION USE: ICD-10-CM

## 2023-09-22 DIAGNOSIS — E78.2 MIXED HYPERLIPIDEMIA: ICD-10-CM

## 2023-09-22 DIAGNOSIS — I48.0 PAROXYSMAL ATRIAL FIBRILLATION (HCC): Primary | ICD-10-CM

## 2023-09-22 DIAGNOSIS — Z79.01 ANTICOAGULATION ADEQUATE: ICD-10-CM

## 2023-09-22 PROCEDURE — 3017F COLORECTAL CA SCREEN DOC REV: CPT | Performed by: INTERNAL MEDICINE

## 2023-09-22 PROCEDURE — 99214 OFFICE O/P EST MOD 30 MIN: CPT | Performed by: INTERNAL MEDICINE

## 2023-09-22 PROCEDURE — G8427 DOCREV CUR MEDS BY ELIG CLIN: HCPCS | Performed by: INTERNAL MEDICINE

## 2023-09-22 PROCEDURE — 1036F TOBACCO NON-USER: CPT | Performed by: INTERNAL MEDICINE

## 2023-09-22 PROCEDURE — 1123F ACP DISCUSS/DSCN MKR DOCD: CPT | Performed by: INTERNAL MEDICINE

## 2023-09-22 PROCEDURE — 93005 ELECTROCARDIOGRAM TRACING: CPT | Performed by: INTERNAL MEDICINE

## 2023-09-22 PROCEDURE — G8420 CALC BMI NORM PARAMETERS: HCPCS | Performed by: INTERNAL MEDICINE

## 2023-09-22 PROCEDURE — 93010 ELECTROCARDIOGRAM REPORT: CPT | Performed by: INTERNAL MEDICINE

## 2023-09-22 RX ORDER — ASPIRIN 81 MG/1
81 TABLET ORAL DAILY
Qty: 90 TABLET | Refills: 1
Start: 2023-11-04

## 2023-09-22 ASSESSMENT — PATIENT HEALTH QUESTIONNAIRE - PHQ9
SUM OF ALL RESPONSES TO PHQ9 QUESTIONS 1 & 2: 0
SUM OF ALL RESPONSES TO PHQ QUESTIONS 1-9: 0
2. FEELING DOWN, DEPRESSED OR HOPELESS: 0
1. LITTLE INTEREST OR PLEASURE IN DOING THINGS: 0
SUM OF ALL RESPONSES TO PHQ QUESTIONS 1-9: 0

## 2023-09-22 NOTE — PATIENT INSTRUCTIONS
Stop Eliquis on 11/4/23, start Aspirin 81 mg daily  FU with NP in 3 months  FU with Dr. Aaliyah Munoz 9 months

## 2023-09-22 NOTE — PROGRESS NOTES
Cardiac Electrophysiology Office Follow-up    NAME: John Almeida   :  1955  MRM:  [id number]    Date:  2023         Assessment and Plan:     1. Paroxysmal atrial fibrillation (HCC)  -     EKG 12 Lead  2. Mixed hyperlipidemia  -     EKG 12 Lead  3. Anticoagulation adequate  -     EKG 12 Lead  4. High risk medication use  -     EKG 12 Lead         atrial fibrillation  Symptomatic paroxysmal atrial fibrillation status post prior A. fib ablation on 2020 with Dr. Nichole Marie. He has had multiple prior cardioversions. Has been doing well until recently on 10/8/2022 had recurrent persistent  atrial fibrillation with associated dyspnea on exertion. Underwent cardioversion. Bradycardia limits long-term antiarrhythmic therapy or even rusty blocking agents. He would like to remain off medicines. - increased in frequency and burden of episodes of AFib  - The implication regarding the diagnosis of AF was explained to the patient in great detail  including the associated risk of CVA, AF-mediated cardiomyopathy, and progression into persistent/chronic AF. - Spoke to patient regarding  the potential role repeat ablation given his inability to tolerate any rusty or antiarrhythmic medications. If he has any further recurrent atrial fibrillation bouts, this would be the next step. He's now interested in proceeding with ablation  -S/p PVI, GP ablation, PWI, CTI ablation (23-Garcia)  -  I'm pleased to hear how well the patient has done post ablation. We spoke in great detail regarding the importance of multidisciplinary management of AFib and the role of risk factors modification in preventing recurrent AF including focusing on diet, weight loss, control of blood pressure, glycemic control, ISABELLE diagnosis and treatment, and medication compliance.   - no longer on Flecainide or PPI  - can stop Eliquis in 23  - FU with NP in 3 months  - FU with Dr. Sheyla Greene in 9 months    Anticoagulation  Denies of any bleeding

## 2023-10-27 LAB
CREATININE, EXTERNAL: 1.05 (ref 0.76–1.27)
HBA1C MFR BLD HPLC: 5.5 %
LDL CHOLESTEROL, EXTERNAL: 50 (ref 0–99)
MICROALBUMIN/CREATININE RATIO, EXTERNAL: 14 (ref 10–24)
TOTAL CHOLESTEROL, EXTERNAL: 124 (ref 100–199)

## 2023-12-04 NOTE — PROGRESS NOTES
Cardiac Electrophysiology Office Follow-up    NAME: Yessy Crowell   :  1955  MRM:  [id number]    Date:  2023     Assessment and Plan:     1. Paroxysmal atrial fibrillation (HCC)  2. S/P ablation of atrial fibrillation  3. Mixed hyperlipidemia    Paroxysmal atrial fibrillation  Symptomatic paroxysmal atrial fibrillation status post prior A. fib ablation on 2020 with Dr. Osmin Veloz. He has had multiple prior cardioversions. Has been doing well until recently on 10/8/2022 had recurrent persistent  atrial fibrillation with associated dyspnea on exertion. Underwent cardioversion. Bradycardia limits long-term antiarrhythmic therapy or even rusty blocking agents. He would like to remain off medicines. - The implication regarding the diagnosis of AF was explained to the patient in great detail  including the associated risk of CVA, AF-mediated cardiomyopathy, and progression into persistent/chronic AF. - Spoke to patient regarding  the potential role repeat ablation given his inability to tolerate any rusty or antiarrhythmic medications. If he has any further recurrent atrial fibrillation bouts, this would be the next step. He's now interested in proceeding with ablation  -S/p PVI, GP ablation, PWI, CTI ablation (23-Garcia)  -  I'm pleased to hear how well the patient has done post ablation. We spoke in great detail regarding the importance of multidisciplinary management of AFib and the role of risk factors modification in preventing recurrent AF including focusing on diet, weight loss, control of blood pressure, glycemic control, ISABELLE diagnosis and treatment, and medication compliance. - no longer on Flecainide or Eliquis  - FU with Dr. Carla Joel in 6 months     Anticoagulation  Denies of any bleeding issues. Know to contact clinic with any bleeding side effects (BRBPR, melena, hemotysis, hematuria).   Continue Eliquis 5 mg twice daily until 23  - CHADS2-VASc score of 1 (Age>65 bpm), now off

## 2023-12-05 ENCOUNTER — OFFICE VISIT (OUTPATIENT)
Age: 68
End: 2023-12-05
Payer: MEDICARE

## 2023-12-05 VITALS
HEART RATE: 56 BPM | DIASTOLIC BLOOD PRESSURE: 74 MMHG | SYSTOLIC BLOOD PRESSURE: 116 MMHG | WEIGHT: 181.6 LBS | OXYGEN SATURATION: 97 % | HEIGHT: 71 IN | BODY MASS INDEX: 25.42 KG/M2 | RESPIRATION RATE: 14 BRPM

## 2023-12-05 DIAGNOSIS — Z86.79 S/P ABLATION OF ATRIAL FIBRILLATION: ICD-10-CM

## 2023-12-05 DIAGNOSIS — E78.2 MIXED HYPERLIPIDEMIA: ICD-10-CM

## 2023-12-05 DIAGNOSIS — I48.0 PAROXYSMAL ATRIAL FIBRILLATION (HCC): Primary | ICD-10-CM

## 2023-12-05 DIAGNOSIS — Z98.890 S/P ABLATION OF ATRIAL FIBRILLATION: ICD-10-CM

## 2023-12-05 PROCEDURE — G8419 CALC BMI OUT NRM PARAM NOF/U: HCPCS | Performed by: NURSE PRACTITIONER

## 2023-12-05 PROCEDURE — 3017F COLORECTAL CA SCREEN DOC REV: CPT | Performed by: NURSE PRACTITIONER

## 2023-12-05 PROCEDURE — 99214 OFFICE O/P EST MOD 30 MIN: CPT | Performed by: NURSE PRACTITIONER

## 2023-12-05 PROCEDURE — G8484 FLU IMMUNIZE NO ADMIN: HCPCS | Performed by: NURSE PRACTITIONER

## 2023-12-05 PROCEDURE — 1123F ACP DISCUSS/DSCN MKR DOCD: CPT | Performed by: NURSE PRACTITIONER

## 2023-12-05 PROCEDURE — G8427 DOCREV CUR MEDS BY ELIG CLIN: HCPCS | Performed by: NURSE PRACTITIONER

## 2023-12-05 PROCEDURE — 1036F TOBACCO NON-USER: CPT | Performed by: NURSE PRACTITIONER

## 2023-12-05 RX ORDER — SIMVASTATIN 40 MG
40 TABLET ORAL NIGHTLY
Qty: 30 TABLET | Refills: 8 | Status: SHIPPED | OUTPATIENT
Start: 2023-12-05

## 2023-12-05 RX ORDER — COLCHICINE 0.6 MG/1
0.6 TABLET ORAL AS NEEDED
COMMUNITY
Start: 2023-11-07

## 2023-12-05 RX ORDER — FINASTERIDE 5 MG/1
5 TABLET, FILM COATED ORAL DAILY
COMMUNITY
Start: 2023-11-28

## 2023-12-05 NOTE — TELEPHONE ENCOUNTER
Requested Prescriptions     Signed Prescriptions Disp Refills    simvastatin (ZOCOR) 40 MG tablet 30 tablet 8     Sig: Take 1 tablet by mouth nightly     Authorizing Provider: Elmira Avila     Ordering User: Srinivas GALDAMEZ     Refills per verbal order from Dr. Tremaine Coles.   Last OV: 5/5/23  Next OV: 5/9/24

## 2024-01-22 ENCOUNTER — TELEPHONE (OUTPATIENT)
Age: 69
End: 2024-01-22

## 2024-01-22 NOTE — TELEPHONE ENCOUNTER
Pt called for cardiac clearance for surgery on 3/11/24 for left knee replacement. He asked if you could post your doing so on the pt portal. Please advise.      Dr Mendiola w/tevin # (o) [phone]  Fax # [phone]

## 2024-01-23 ENCOUNTER — TELEPHONE (OUTPATIENT)
Age: 69
End: 2024-01-23

## 2024-01-23 NOTE — TELEPHONE ENCOUNTER
Pt called today in reference to his cardiac clearance for surgery on 3/11/24 for his left knee replacement. Pt stated he saw the letter on the [employer] portal. He stated the office number to Dr. Mendiola that he left yesterday is not a working phone number and was calling back today to give the new number.     Gilles Mendiola Ph# [phone]  Fax # [phone]

## 2024-02-12 NOTE — TELEPHONE ENCOUNTER
Patient calling in regards to updated insurance and pharmacy, states they no longer use [employer] home delivery and not sure what the new one is. States tat he wants to make sure that medication will be filled out at local pharmacy but does not know how.     Phone: [phone]
Spoke to pt,  Requesting refill on Eliquis & simvastatin 40 mg to [facility] on [address]. Pt to c/b when he gets mail order figured out. Refill per VO of Dr. Mcleod Person:  Last appt: 1/12/2021  Future Appointments   Date Time Provider Mary Dodd   4/14/2021  8:00 AM MD TERRY FordF BS AMB   4/21/2021  3:40 PM MD SATNAM Massey BS AMB       Requested Prescriptions     Signed Prescriptions Disp Refills    simvastatin (ZOCOR) 40 mg tablet 90 Tab 0     Sig: Take 1 Tab by mouth nightly. Authorizing Provider: Jose Peacock     Ordering User: Medardo Haro apixaban (Eliquis) 5 mg tablet 180 Tab 0     Sig: Take 1 Tab by mouth two (2) times a day.      Authorizing Provider: Jose Peacock     Ordering User: Gary Durant
[id number]

## 2024-05-09 ENCOUNTER — OFFICE VISIT (OUTPATIENT)
Age: 69
End: 2024-05-09
Payer: MEDICARE

## 2024-05-09 VITALS
HEIGHT: 71 IN | BODY MASS INDEX: 24.53 KG/M2 | OXYGEN SATURATION: 97 % | HEART RATE: 76 BPM | DIASTOLIC BLOOD PRESSURE: 70 MMHG | RESPIRATION RATE: 18 BRPM | WEIGHT: 175.2 LBS | SYSTOLIC BLOOD PRESSURE: 118 MMHG

## 2024-05-09 DIAGNOSIS — E78.2 MIXED HYPERLIPIDEMIA: ICD-10-CM

## 2024-05-09 DIAGNOSIS — I48.0 PAROXYSMAL ATRIAL FIBRILLATION (HCC): Primary | ICD-10-CM

## 2024-05-09 PROCEDURE — 1036F TOBACCO NON-USER: CPT | Performed by: SPECIALIST

## 2024-05-09 PROCEDURE — 99214 OFFICE O/P EST MOD 30 MIN: CPT | Performed by: SPECIALIST

## 2024-05-09 PROCEDURE — G8420 CALC BMI NORM PARAMETERS: HCPCS | Performed by: SPECIALIST

## 2024-05-09 PROCEDURE — 3017F COLORECTAL CA SCREEN DOC REV: CPT | Performed by: SPECIALIST

## 2024-05-09 PROCEDURE — G8427 DOCREV CUR MEDS BY ELIG CLIN: HCPCS | Performed by: SPECIALIST

## 2024-05-09 PROCEDURE — 1123F ACP DISCUSS/DSCN MKR DOCD: CPT | Performed by: SPECIALIST

## 2024-05-09 NOTE — PROGRESS NOTES
Chief Complaint   Patient presents with    Annual Exam    Other     PAF      Hyperlipidemia     Vitals:    05/09/24 1027   BP: 118/70   Site: Left Upper Arm   Position: Sitting   Cuff Size: Medium Adult   Pulse: 76   Resp: 18   SpO2: 97%   Weight: 79.5 kg (175 lb 3.2 oz)   Height: 1.803 m (5' 11\")   No chest pain/SOB  Left knee replacement on 3/11/24   No refills needed    
15.0 standard drinks of alcohol     Types: 12 - 15 Cans of beer per week    Drug use: No         FAMILY HISTORY:     Family History   Problem Relation Age of Onset    Arrhythmia Father          REVIEW OF SYMPTOMS:     Review of Symptoms:  Constitutional: Negative for fever, chills  HEENT: Negative for nosebleeds, tinnitus, and vision changes.   Respiratory: Negative for cough, wheezing  Cardiovascular: Negative for orthopnea, claudication, syncope  Gastrointestinal: Negative for abdominal pain, diarrhea, melena.   Genitourinary: Negative for dysuria  Musculoskeletal: Negative for myalgias.   Skin: Negative for rash  Heme: No problems bleeding.  Neurological: Negative for speech change and focal weakness.       PHYSICAL EXAM:     Physical Exam:  /70 (Site: Left Upper Arm, Position: Sitting, Cuff Size: Medium Adult)   Pulse 76   Resp 18   Ht 1.803 m (5' 11\")   Wt 79.5 kg (175 lb 3.2 oz)   SpO2 97%   BMI 24.44 kg/m²     Patient appears generally well, mood and affect are appropriate and pleasant.  HEENT:  Hearing intact, non-icteric, normocephalic, atraumatic.   Neck Exam: Supple, No JVD   Lung Exam: Clear to auscultation, even breath sounds.   Cardiac Exam: Regular rate and rhythm with no murmur or rub  Abdomen: Soft, non-tender  Extremities: Moves all ext well. No lower extremity edema.  MSKTL: Overall good ROM ext  Skin: No significant rashes  Psych: Appropriate affect  Neuro - Grossly intact        LABS / OTHER STUDIES:     Lab Results   Component Value Date     07/21/2023    K 4.9 07/21/2023     07/21/2023    CO2 26 07/21/2023    BUN 15 07/21/2023    CREATININE 1.16 07/21/2023    GLUCOSE 94 07/21/2023    CALCIUM 10.7 (H) 07/21/2023    BILITOT 1.7 (H) 04/04/2023    ALKPHOS 61 04/04/2023    AST 30 04/04/2023    ALT 36 04/04/2023    LABGLOM 69 07/21/2023    GFRAA 84 12/14/2020    AGRATIO 1.3 04/04/2023    GLOB 2.8 04/04/2023       Lab Results   Component Value Date    CHOL 153 04/01/2022

## 2024-05-29 ENCOUNTER — OFFICE VISIT (OUTPATIENT)
Age: 69
End: 2024-05-29
Payer: MEDICARE

## 2024-05-29 VITALS
OXYGEN SATURATION: 97 % | DIASTOLIC BLOOD PRESSURE: 70 MMHG | WEIGHT: 176.2 LBS | RESPIRATION RATE: 14 BRPM | BODY MASS INDEX: 24.67 KG/M2 | HEART RATE: 50 BPM | HEIGHT: 71 IN | SYSTOLIC BLOOD PRESSURE: 122 MMHG

## 2024-05-29 DIAGNOSIS — I49.3 PVC (PREMATURE VENTRICULAR CONTRACTION): ICD-10-CM

## 2024-05-29 DIAGNOSIS — I48.0 PAROXYSMAL ATRIAL FIBRILLATION (HCC): Primary | ICD-10-CM

## 2024-05-29 PROCEDURE — 1036F TOBACCO NON-USER: CPT | Performed by: INTERNAL MEDICINE

## 2024-05-29 PROCEDURE — 99214 OFFICE O/P EST MOD 30 MIN: CPT | Performed by: INTERNAL MEDICINE

## 2024-05-29 PROCEDURE — G8420 CALC BMI NORM PARAMETERS: HCPCS | Performed by: INTERNAL MEDICINE

## 2024-05-29 PROCEDURE — 1123F ACP DISCUSS/DSCN MKR DOCD: CPT | Performed by: INTERNAL MEDICINE

## 2024-05-29 PROCEDURE — G8427 DOCREV CUR MEDS BY ELIG CLIN: HCPCS | Performed by: INTERNAL MEDICINE

## 2024-05-29 PROCEDURE — 3017F COLORECTAL CA SCREEN DOC REV: CPT | Performed by: INTERNAL MEDICINE

## 2024-05-29 PROCEDURE — 93005 ELECTROCARDIOGRAM TRACING: CPT | Performed by: INTERNAL MEDICINE

## 2024-05-29 PROCEDURE — 93010 ELECTROCARDIOGRAM REPORT: CPT | Performed by: INTERNAL MEDICINE

## 2024-05-29 NOTE — PROGRESS NOTES
Cardiac Electrophysiology Office Follow-up    NAME: Venkat Brambila   :  1955  MRM:  [id number]    Date:  2024     Assessment and Plan:     1. Paroxysmal atrial fibrillation (HCC)  -     EKG 12 Lead  2. PVC (premature ventricular contraction)  -     EKG 12 Lead    Paroxysmal atrial fibrillation  Symptomatic paroxysmal atrial fibrillation status post prior A. fib ablation on 2020 with Dr. Caldwell.  He has had multiple prior cardioversions.  Has been doing well until recently on 10/8/2022 had recurrent persistent  atrial fibrillation with associated dyspnea on exertion.  Underwent cardioversion.  Bradycardia limits long-term antiarrhythmic therapy or even rusty blocking agents.  He would like to remain off medicines.    - The implication regarding the diagnosis of AF was explained to the patient in great detail  including the associated risk of CVA, AF-mediated cardiomyopathy, and progression into persistent/chronic AF.  - Spoke to patient regarding  the potential role repeat ablation given his inability to tolerate any rusty or antiarrhythmic medications.  If he has any further recurrent atrial fibrillation bouts, this would be the next step. He's now interested in proceeding with ablation  -S/p PVI, GP ablation, PWI, CTI ablation (23-Garcia)  -  I'm pleased to hear how well the patient has done post ablation. We spoke in great detail regarding the importance of multidisciplinary management of AFib and the role of risk factors modification in preventing recurrent AF including focusing on diet, weight loss, control of blood pressure, glycemic control, ISABELLE diagnosis and treatment, and medication compliance.  - no longer on Flecainide or Eliquis  - no recurrence despite recent knee surgery  - in sinus rhythm today, no recurrent palpitations  - Obtain 1 week event monitor in 6 months  - FU with me in 1 year    Anticoagulation  Denies of any bleeding issues. Know to contact clinic with any bleeding

## 2024-05-29 NOTE — PROGRESS NOTES
Room #: EP 2    Chief Complaint   Patient presents with    Atrial Fibrillation     /70 (Site: Left Upper Arm, Position: Sitting, Cuff Size: Medium Adult)   Pulse 50   Resp 14   Ht 1.803 m (5' 11\")   Wt 79.9 kg (176 lb 3.2 oz)   SpO2 97%   BMI 24.57 kg/m²         Chest pain: NO       1. Have you been to the ER, urgent care clinic outside [facility] since your last visit?  Hospitalized since your last visit?  NO        Refills:  NO

## 2024-12-02 ENCOUNTER — TELEPHONE (OUTPATIENT)
Age: 69
End: 2024-12-02

## 2024-12-02 NOTE — TELEPHONE ENCOUNTER
Enrolled with [employer] - Ordered and being shipped to patient's home address on file.  ETA within 5-7 business days.         Previous Messages       ----- Message -----  From: Peace Sequeira LPN  Sent: 5/29/2024  11:53 AM EDT  To: Catalina Couch    1 week monitor in 6 months

## 2025-05-23 ENCOUNTER — OFFICE VISIT (OUTPATIENT)
Age: 70
End: 2025-05-23
Payer: MEDICARE

## 2025-05-23 VITALS
WEIGHT: 178.8 LBS | HEART RATE: 53 BPM | SYSTOLIC BLOOD PRESSURE: 118 MMHG | HEIGHT: 71 IN | BODY MASS INDEX: 25.03 KG/M2 | DIASTOLIC BLOOD PRESSURE: 70 MMHG | OXYGEN SATURATION: 97 %

## 2025-05-23 DIAGNOSIS — I48.0 PAROXYSMAL ATRIAL FIBRILLATION (HCC): Primary | ICD-10-CM

## 2025-05-23 DIAGNOSIS — Z79.01 ANTICOAGULATION ADEQUATE: ICD-10-CM

## 2025-05-23 PROCEDURE — 1160F RVW MEDS BY RX/DR IN RCRD: CPT | Performed by: NURSE PRACTITIONER

## 2025-05-23 PROCEDURE — 3017F COLORECTAL CA SCREEN DOC REV: CPT | Performed by: NURSE PRACTITIONER

## 2025-05-23 PROCEDURE — 99214 OFFICE O/P EST MOD 30 MIN: CPT | Performed by: NURSE PRACTITIONER

## 2025-05-23 PROCEDURE — 1123F ACP DISCUSS/DSCN MKR DOCD: CPT | Performed by: NURSE PRACTITIONER

## 2025-05-23 PROCEDURE — 1036F TOBACCO NON-USER: CPT | Performed by: NURSE PRACTITIONER

## 2025-05-23 PROCEDURE — G8420 CALC BMI NORM PARAMETERS: HCPCS | Performed by: NURSE PRACTITIONER

## 2025-05-23 PROCEDURE — G8427 DOCREV CUR MEDS BY ELIG CLIN: HCPCS | Performed by: NURSE PRACTITIONER

## 2025-05-23 PROCEDURE — 1159F MED LIST DOCD IN RCRD: CPT | Performed by: NURSE PRACTITIONER

## 2025-05-23 NOTE — PROGRESS NOTES
Chief Complaint   Patient presents with    Procedure     PAF     Vitals:    05/23/25 1107   BP: 118/70   BP Site: Left Upper Arm   Patient Position: Sitting   Pulse: 53   SpO2: 97%   Weight: 81.1 kg (178 lb 12.8 oz)   Height: 1.803 m (5' 11\")     Chest pain: DENIED     Recent hospital stays: DENIED     Refills: DENIED   
is seen for follow up of  Atrial Fibrillation.     Prior Afib ablation in 12/22/20. Multiple prior DCCVs, last on 10/8/22. Low HR limits medications.     S/p redo Afib ablation 8/4/23 with  PVI, GP ablation, PWI, CTI ablation.     He has had no known recurrence. He is feeling well and denies chest pain, palpitations, dyspnea, dizziness or syncope.     I independently review internal and external records of most recent labs, EKGs, event monitors or Holters, and imaging including most recent echocardiograms and stress test.  Ordered follow up testing as indicated in orders, patient instructions. Previous notes from consultants and PCP are reviewed as well as pertinent prior admission documents.          Review of Systems:   12 point review of systems was performed. All negative except for HPI    Exam:     Physical Exam:  /70 (BP Site: Left Upper Arm, Patient Position: Sitting)   Pulse 53   Ht 1.803 m (5' 11\")   Wt 81.1 kg (178 lb 12.8 oz)   SpO2 97%   BMI 24.94 kg/m²      PHYSICAL EXAM  General:  Alert and oriented, in no acute distress  Head:  Atraumatic, normocephalic  Eyes:  extraocular muscles intact  Neck:  Supple, normal range of motion  Lungs:  Clear to auscultation bilaterally, no wheezes/rales/rhonchi   Cardiovascular:  Regular rate and rhythm, normal S1-S2, no murmurs/rubs/gallops  Abdomen:  Soft, nontender, nondistended, normoactive bowel sounds  Skin:  Intact, no rash  Extremities:, no clubbing, cyanosis, or edema  Musculoskeletal: normal range of motion  Neurological:  Alert and oriented, no focal neurologic deficits  Psychiatric:  Normal mood and affect          Medications:     Current Outpatient Medications   Medication Sig    Multiple Vitamins-Minerals (PRESERVISION AREDS 2 PO) Take by mouth    finasteride (PROSCAR) 5 MG tablet Take 1 tablet by mouth daily    colchicine (COLCRYS) 0.6 MG tablet Take 1 tablet by mouth as needed (gout)    simvastatin (ZOCOR) 40 MG tablet Take 1 tablet by mouth

## (undated) DEVICE — Device: Brand: RFP-100A CONNECTOR CABLE

## (undated) DEVICE — TORFLEX TRANSSEPTAL SHEATH; TRANSSEPTAL DILATOR; J-TIP GUIDEWIRE: Brand: TORFLEX TRANSSEPTAL GUIDING SHEATH

## (undated) DEVICE — Device: Brand: NRG TRANSSEPTAL NEEDLE

## (undated) DEVICE — 3M™ TEGADERM™ TRANSPARENT FILM DRESSING FRAME STYLE, 1626W, 4 IN X 4-3/4 IN (10 CM X 12 CM), 50/CT 4CT/CASE: Brand: 3M™ TEGADERM™

## (undated) DEVICE — HEART CATH-MRMC: Brand: MEDLINE INDUSTRIES, INC.

## (undated) DEVICE — ELECTRODE PT RET AD L9FT HI MOIST COND ADH HYDRGEL CORDED

## (undated) DEVICE — COVER CATH ACUNAV ULTRASOUND 5X72IN ANTI STATIC

## (undated) DEVICE — CATHETER EP 7FR L115CM 2-8-2MM SPC TIP 2MM 10 ELECTRD F L

## (undated) DEVICE — INTRODUCER SHTH 11FR CANN L23CM DIL TIP 45MM YEL W/O MINI

## (undated) DEVICE — PATCH REF EXT FOR CARTO 3 SYS (EA = 6 PACKS)

## (undated) DEVICE — CATHETER ABLAT 8FR L115CM 1-6-2MM SPC TIP 3.5MM FJ CRV

## (undated) DEVICE — CATH EP MAP 2-6-2 7FR D CRV -- PENTARAY

## (undated) DEVICE — Device: Brand: PADPRO

## (undated) DEVICE — PINNACLE INTRODUCER SHEATH: Brand: PINNACLE

## (undated) DEVICE — CABLE CONN TO CATH TO CARTO 3 --

## (undated) DEVICE — ROYALSILK SURGICAL GOWN, L: Brand: CONVERTORS

## (undated) DEVICE — REM POLYHESIVE ADULT PATIENT RETURN ELECTRODE: Brand: VALLEYLAB

## (undated) DEVICE — CABLE CATH L10FT RED PIN CONN 34-34 FOR THERMOCOOL

## (undated) DEVICE — PROVE COVER: Brand: UNBRANDED

## (undated) DEVICE — PACK PROCEDURE SURG HRT CATH

## (undated) DEVICE — PRESSURE MONITORING SET: Brand: TRUWAVE

## (undated) DEVICE — PAD GROUNDING ADLT ADH FOIL 9FT CORD UNIV

## (undated) DEVICE — ELECTRODE,RADIOTRANSLUCENT,FOAM,5PK: Brand: MEDLINE

## (undated) DEVICE — MEDI-TRACE CADENCE ADULT, DEFIBRILLATION ELECTRODE -RTS  (10 PR/PK) - PHYSIO-CONTROL: Brand: MEDI-TRACE CADENCE

## (undated) DEVICE — SHEATH INTRO 8.5FR L71CM 8.5FR DIL GWIRE L180CM DIA0.032IN

## (undated) DEVICE — PERCLOSE PROGLIDE™ SUTURE-MEDIATED CLOSURE SYSTEM: Brand: PERCLOSE PROGLIDE™

## (undated) DEVICE — PATCH CARTO 3 EXT REF --

## (undated) DEVICE — TUBE SET IRR PUMP THERMALCOOL -- SMARTABLATE

## (undated) DEVICE — CATHETER ABLAT 8FR L115CM 1-6-2MM SPC TIP 3.5MM DF CRV

## (undated) DEVICE — CATHETER US 8FR L90CM GRN TIP OVERLAY FOR GE-VIVID I VIVID

## (undated) DEVICE — CABLE CATH L2.7M CONNECTS TO CARTO 3 SYS PIU FOR LASSO ECO

## (undated) DEVICE — PROBE ES TEMP HOT AND CLD FAST ACCURATE SFT FLX CIRCA S CATH

## (undated) DEVICE — Device: Brand: PROTRACK PIGTAIL WIRE

## (undated) DEVICE — CATHETER MAP D CRV 3-3-3-3-3 MM SPC GALAXY OCTARAY

## (undated) DEVICE — TUBING PMP FOR CARTO SYS SMARTABLATE

## (undated) DEVICE — WASTE KIT - ST MARY: Brand: MEDLINE INDUSTRIES, INC.